# Patient Record
Sex: FEMALE | Race: BLACK OR AFRICAN AMERICAN | NOT HISPANIC OR LATINO
[De-identification: names, ages, dates, MRNs, and addresses within clinical notes are randomized per-mention and may not be internally consistent; named-entity substitution may affect disease eponyms.]

---

## 2017-02-20 ENCOUNTER — TRANSCRIPTION ENCOUNTER (OUTPATIENT)
Age: 62
End: 2017-02-20

## 2017-04-10 ENCOUNTER — OUTPATIENT (OUTPATIENT)
Dept: OUTPATIENT SERVICES | Facility: HOSPITAL | Age: 62
LOS: 1 days | Discharge: HOME | End: 2017-04-10

## 2017-06-27 DIAGNOSIS — D50.9 IRON DEFICIENCY ANEMIA, UNSPECIFIED: ICD-10-CM

## 2017-06-27 DIAGNOSIS — R79.89 OTHER SPECIFIED ABNORMAL FINDINGS OF BLOOD CHEMISTRY: ICD-10-CM

## 2017-06-27 DIAGNOSIS — E55.9 VITAMIN D DEFICIENCY, UNSPECIFIED: ICD-10-CM

## 2017-06-27 DIAGNOSIS — D84.1 DEFECTS IN THE COMPLEMENT SYSTEM: ICD-10-CM

## 2017-06-27 DIAGNOSIS — M06.4 INFLAMMATORY POLYARTHROPATHY: ICD-10-CM

## 2017-08-21 ENCOUNTER — INPATIENT (INPATIENT)
Facility: HOSPITAL | Age: 62
LOS: 1 days | Discharge: HOME | End: 2017-08-23
Attending: INTERNAL MEDICINE

## 2017-08-21 DIAGNOSIS — E03.9 HYPOTHYROIDISM, UNSPECIFIED: ICD-10-CM

## 2017-08-21 DIAGNOSIS — I10 ESSENTIAL (PRIMARY) HYPERTENSION: ICD-10-CM

## 2017-08-21 DIAGNOSIS — R07.89 OTHER CHEST PAIN: ICD-10-CM

## 2017-08-21 DIAGNOSIS — R07.9 CHEST PAIN, UNSPECIFIED: ICD-10-CM

## 2017-08-21 DIAGNOSIS — D64.9 ANEMIA, UNSPECIFIED: ICD-10-CM

## 2017-08-21 DIAGNOSIS — M35.00 SJOGREN SYNDROME, UNSPECIFIED: ICD-10-CM

## 2017-08-25 DIAGNOSIS — M35.00 SJOGREN SYNDROME, UNSPECIFIED: ICD-10-CM

## 2017-08-25 DIAGNOSIS — E03.9 HYPOTHYROIDISM, UNSPECIFIED: ICD-10-CM

## 2017-08-25 DIAGNOSIS — R07.9 CHEST PAIN, UNSPECIFIED: ICD-10-CM

## 2017-08-25 DIAGNOSIS — Z96.641 PRESENCE OF RIGHT ARTIFICIAL HIP JOINT: ICD-10-CM

## 2017-08-25 DIAGNOSIS — K21.9 GASTRO-ESOPHAGEAL REFLUX DISEASE WITHOUT ESOPHAGITIS: ICD-10-CM

## 2017-08-25 DIAGNOSIS — I10 ESSENTIAL (PRIMARY) HYPERTENSION: ICD-10-CM

## 2017-08-25 DIAGNOSIS — E78.5 HYPERLIPIDEMIA, UNSPECIFIED: ICD-10-CM

## 2017-10-18 ENCOUNTER — APPOINTMENT (OUTPATIENT)
Dept: CARDIOLOGY | Facility: CLINIC | Age: 62
End: 2017-10-18

## 2018-03-31 ENCOUNTER — OUTPATIENT (OUTPATIENT)
Dept: OUTPATIENT SERVICES | Facility: HOSPITAL | Age: 63
LOS: 1 days | Discharge: HOME | End: 2018-03-31

## 2018-03-31 DIAGNOSIS — Z12.31 ENCOUNTER FOR SCREENING MAMMOGRAM FOR MALIGNANT NEOPLASM OF BREAST: ICD-10-CM

## 2018-06-09 ENCOUNTER — OUTPATIENT (OUTPATIENT)
Dept: OUTPATIENT SERVICES | Facility: HOSPITAL | Age: 63
LOS: 1 days | Discharge: HOME | End: 2018-06-09

## 2018-06-09 DIAGNOSIS — E03.9 HYPOTHYROIDISM, UNSPECIFIED: ICD-10-CM

## 2018-06-09 DIAGNOSIS — M06.4 INFLAMMATORY POLYARTHROPATHY: ICD-10-CM

## 2018-06-09 DIAGNOSIS — M35.00 SJOGREN SYNDROME, UNSPECIFIED: ICD-10-CM

## 2018-06-09 DIAGNOSIS — M47.892 OTHER SPONDYLOSIS, CERVICAL REGION: ICD-10-CM

## 2018-06-09 DIAGNOSIS — I10 ESSENTIAL (PRIMARY) HYPERTENSION: ICD-10-CM

## 2018-07-02 ENCOUNTER — OUTPATIENT (OUTPATIENT)
Dept: OUTPATIENT SERVICES | Facility: HOSPITAL | Age: 63
LOS: 1 days | Discharge: HOME | End: 2018-07-02

## 2018-07-02 DIAGNOSIS — I25.10 ATHEROSCLEROTIC HEART DISEASE OF NATIVE CORONARY ARTERY WITHOUT ANGINA PECTORIS: ICD-10-CM

## 2018-09-04 ENCOUNTER — OUTPATIENT (OUTPATIENT)
Dept: OUTPATIENT SERVICES | Facility: HOSPITAL | Age: 63
LOS: 1 days | Discharge: HOME | End: 2018-09-04

## 2018-09-04 DIAGNOSIS — R05 COUGH: ICD-10-CM

## 2018-09-28 ENCOUNTER — OUTPATIENT (OUTPATIENT)
Dept: OUTPATIENT SERVICES | Facility: HOSPITAL | Age: 63
LOS: 1 days | Discharge: HOME | End: 2018-09-28

## 2018-09-28 DIAGNOSIS — N64.4 MASTODYNIA: ICD-10-CM

## 2018-09-28 DIAGNOSIS — N63.10 UNSPECIFIED LUMP IN THE RIGHT BREAST, UNSPECIFIED QUADRANT: ICD-10-CM

## 2018-09-28 DIAGNOSIS — N63.20 UNSPECIFIED LUMP IN THE LEFT BREAST, UNSPECIFIED QUADRANT: ICD-10-CM

## 2018-11-06 ENCOUNTER — TRANSCRIPTION ENCOUNTER (OUTPATIENT)
Age: 63
End: 2018-11-06

## 2019-03-28 ENCOUNTER — OUTPATIENT (OUTPATIENT)
Dept: OUTPATIENT SERVICES | Facility: HOSPITAL | Age: 64
LOS: 1 days | Discharge: HOME | End: 2019-03-28

## 2019-03-28 DIAGNOSIS — E78.5 HYPERLIPIDEMIA, UNSPECIFIED: ICD-10-CM

## 2019-03-28 DIAGNOSIS — I10 ESSENTIAL (PRIMARY) HYPERTENSION: ICD-10-CM

## 2019-03-28 DIAGNOSIS — N39.0 URINARY TRACT INFECTION, SITE NOT SPECIFIED: ICD-10-CM

## 2019-03-28 DIAGNOSIS — R79.89 OTHER SPECIFIED ABNORMAL FINDINGS OF BLOOD CHEMISTRY: ICD-10-CM

## 2019-03-28 DIAGNOSIS — M32.10 SYSTEMIC LUPUS ERYTHEMATOSUS, ORGAN OR SYSTEM INVOLVEMENT UNSPECIFIED: ICD-10-CM

## 2019-03-28 DIAGNOSIS — E53.9 VITAMIN B DEFICIENCY, UNSPECIFIED: ICD-10-CM

## 2019-03-28 DIAGNOSIS — D84.1 DEFECTS IN THE COMPLEMENT SYSTEM: ICD-10-CM

## 2019-03-28 DIAGNOSIS — E78.1 PURE HYPERGLYCERIDEMIA: ICD-10-CM

## 2019-04-20 ENCOUNTER — OUTPATIENT (OUTPATIENT)
Dept: OUTPATIENT SERVICES | Facility: HOSPITAL | Age: 64
LOS: 1 days | Discharge: HOME | End: 2019-04-20
Payer: COMMERCIAL

## 2019-04-20 DIAGNOSIS — Z12.31 ENCOUNTER FOR SCREENING MAMMOGRAM FOR MALIGNANT NEOPLASM OF BREAST: ICD-10-CM

## 2019-04-20 PROCEDURE — 77067 SCR MAMMO BI INCL CAD: CPT | Mod: 26

## 2019-04-20 PROCEDURE — 77063 BREAST TOMOSYNTHESIS BI: CPT | Mod: 26

## 2019-04-22 DIAGNOSIS — Z79.52 LONG TERM (CURRENT) USE OF SYSTEMIC STEROIDS: ICD-10-CM

## 2019-04-22 DIAGNOSIS — Z78.0 ASYMPTOMATIC MENOPAUSAL STATE: ICD-10-CM

## 2019-04-22 DIAGNOSIS — M81.0 AGE-RELATED OSTEOPOROSIS WITHOUT CURRENT PATHOLOGICAL FRACTURE: ICD-10-CM

## 2019-04-22 DIAGNOSIS — Z13.820 ENCOUNTER FOR SCREENING FOR OSTEOPOROSIS: ICD-10-CM

## 2019-09-28 ENCOUNTER — TRANSCRIPTION ENCOUNTER (OUTPATIENT)
Age: 64
End: 2019-09-28

## 2019-10-02 ENCOUNTER — APPOINTMENT (OUTPATIENT)
Dept: CARDIOLOGY | Facility: CLINIC | Age: 64
End: 2019-10-02
Payer: COMMERCIAL

## 2019-10-02 PROCEDURE — 93000 ELECTROCARDIOGRAM COMPLETE: CPT

## 2019-10-02 PROCEDURE — 99213 OFFICE O/P EST LOW 20 MIN: CPT

## 2020-01-10 ENCOUNTER — TRANSCRIPTION ENCOUNTER (OUTPATIENT)
Age: 65
End: 2020-01-10

## 2020-04-01 ENCOUNTER — APPOINTMENT (OUTPATIENT)
Dept: CARDIOLOGY | Facility: CLINIC | Age: 65
End: 2020-04-01

## 2020-07-11 ENCOUNTER — OUTPATIENT (OUTPATIENT)
Dept: OUTPATIENT SERVICES | Facility: HOSPITAL | Age: 65
LOS: 1 days | Discharge: HOME | End: 2020-07-11
Payer: COMMERCIAL

## 2020-07-11 DIAGNOSIS — Z12.31 ENCOUNTER FOR SCREENING MAMMOGRAM FOR MALIGNANT NEOPLASM OF BREAST: ICD-10-CM

## 2020-07-11 PROCEDURE — 77063 BREAST TOMOSYNTHESIS BI: CPT | Mod: 26

## 2020-07-11 PROCEDURE — 77067 SCR MAMMO BI INCL CAD: CPT | Mod: 26

## 2021-07-22 ENCOUNTER — NON-APPOINTMENT (OUTPATIENT)
Age: 66
End: 2021-07-22

## 2021-10-19 ENCOUNTER — OUTPATIENT (OUTPATIENT)
Dept: OUTPATIENT SERVICES | Facility: HOSPITAL | Age: 66
LOS: 1 days | Discharge: HOME | End: 2021-10-19
Payer: COMMERCIAL

## 2021-10-19 VITALS
SYSTOLIC BLOOD PRESSURE: 176 MMHG | TEMPERATURE: 98 F | HEIGHT: 59 IN | OXYGEN SATURATION: 100 % | WEIGHT: 136.91 LBS | HEART RATE: 71 BPM | RESPIRATION RATE: 14 BRPM | DIASTOLIC BLOOD PRESSURE: 83 MMHG

## 2021-10-19 DIAGNOSIS — N95.0 POSTMENOPAUSAL BLEEDING: ICD-10-CM

## 2021-10-19 DIAGNOSIS — Z98.891 HISTORY OF UTERINE SCAR FROM PREVIOUS SURGERY: Chronic | ICD-10-CM

## 2021-10-19 DIAGNOSIS — Z01.818 ENCOUNTER FOR OTHER PREPROCEDURAL EXAMINATION: ICD-10-CM

## 2021-10-19 DIAGNOSIS — Z96.641 PRESENCE OF RIGHT ARTIFICIAL HIP JOINT: Chronic | ICD-10-CM

## 2021-10-19 LAB
ALBUMIN SERPL ELPH-MCNC: 4.8 G/DL — SIGNIFICANT CHANGE UP (ref 3.5–5.2)
ALP SERPL-CCNC: 82 U/L — SIGNIFICANT CHANGE UP (ref 30–115)
ALT FLD-CCNC: 19 U/L — SIGNIFICANT CHANGE UP (ref 0–41)
ANION GAP SERPL CALC-SCNC: 13 MMOL/L — SIGNIFICANT CHANGE UP (ref 7–14)
AST SERPL-CCNC: 19 U/L — SIGNIFICANT CHANGE UP (ref 0–41)
BASOPHILS # BLD AUTO: 0.02 K/UL — SIGNIFICANT CHANGE UP (ref 0–0.2)
BASOPHILS NFR BLD AUTO: 0.3 % — SIGNIFICANT CHANGE UP (ref 0–1)
BILIRUB SERPL-MCNC: 0.3 MG/DL — SIGNIFICANT CHANGE UP (ref 0.2–1.2)
BUN SERPL-MCNC: 13 MG/DL — SIGNIFICANT CHANGE UP (ref 10–20)
CALCIUM SERPL-MCNC: 10.4 MG/DL — HIGH (ref 8.5–10.1)
CHLORIDE SERPL-SCNC: 101 MMOL/L — SIGNIFICANT CHANGE UP (ref 98–110)
CO2 SERPL-SCNC: 25 MMOL/L — SIGNIFICANT CHANGE UP (ref 17–32)
CREAT SERPL-MCNC: 0.6 MG/DL — LOW (ref 0.7–1.5)
EOSINOPHIL # BLD AUTO: 0.11 K/UL — SIGNIFICANT CHANGE UP (ref 0–0.7)
EOSINOPHIL NFR BLD AUTO: 1.9 % — SIGNIFICANT CHANGE UP (ref 0–8)
GLUCOSE SERPL-MCNC: 86 MG/DL — SIGNIFICANT CHANGE UP (ref 70–99)
HCT VFR BLD CALC: 39.2 % — SIGNIFICANT CHANGE UP (ref 37–47)
HGB BLD-MCNC: 12.7 G/DL — SIGNIFICANT CHANGE UP (ref 12–16)
IMM GRANULOCYTES NFR BLD AUTO: 0.2 % — SIGNIFICANT CHANGE UP (ref 0.1–0.3)
LYMPHOCYTES # BLD AUTO: 1.81 K/UL — SIGNIFICANT CHANGE UP (ref 1.2–3.4)
LYMPHOCYTES # BLD AUTO: 31.2 % — SIGNIFICANT CHANGE UP (ref 20.5–51.1)
MCHC RBC-ENTMCNC: 29.1 PG — SIGNIFICANT CHANGE UP (ref 27–31)
MCHC RBC-ENTMCNC: 32.4 G/DL — SIGNIFICANT CHANGE UP (ref 32–37)
MCV RBC AUTO: 89.9 FL — SIGNIFICANT CHANGE UP (ref 81–99)
MONOCYTES # BLD AUTO: 0.58 K/UL — SIGNIFICANT CHANGE UP (ref 0.1–0.6)
MONOCYTES NFR BLD AUTO: 10 % — HIGH (ref 1.7–9.3)
NEUTROPHILS # BLD AUTO: 3.27 K/UL — SIGNIFICANT CHANGE UP (ref 1.4–6.5)
NEUTROPHILS NFR BLD AUTO: 56.4 % — SIGNIFICANT CHANGE UP (ref 42.2–75.2)
NRBC # BLD: 0 /100 WBCS — SIGNIFICANT CHANGE UP (ref 0–0)
PLATELET # BLD AUTO: 295 K/UL — SIGNIFICANT CHANGE UP (ref 130–400)
POTASSIUM SERPL-MCNC: 4 MMOL/L — SIGNIFICANT CHANGE UP (ref 3.5–5)
POTASSIUM SERPL-SCNC: 4 MMOL/L — SIGNIFICANT CHANGE UP (ref 3.5–5)
PROT SERPL-MCNC: 8.8 G/DL — HIGH (ref 6–8)
RBC # BLD: 4.36 M/UL — SIGNIFICANT CHANGE UP (ref 4.2–5.4)
RBC # FLD: 12 % — SIGNIFICANT CHANGE UP (ref 11.5–14.5)
SODIUM SERPL-SCNC: 139 MMOL/L — SIGNIFICANT CHANGE UP (ref 135–146)
T3 SERPL-MCNC: 100 NG/DL — SIGNIFICANT CHANGE UP (ref 80–200)
T4 AB SER-ACNC: 9.5 UG/DL — SIGNIFICANT CHANGE UP (ref 4.6–12)
TSH SERPL-MCNC: 0.65 UIU/ML — SIGNIFICANT CHANGE UP (ref 0.27–4.2)
WBC # BLD: 5.8 K/UL — SIGNIFICANT CHANGE UP (ref 4.8–10.8)
WBC # FLD AUTO: 5.8 K/UL — SIGNIFICANT CHANGE UP (ref 4.8–10.8)

## 2021-10-19 PROCEDURE — 71046 X-RAY EXAM CHEST 2 VIEWS: CPT | Mod: 26

## 2021-10-19 PROCEDURE — 93010 ELECTROCARDIOGRAM REPORT: CPT

## 2021-10-19 NOTE — H&P PST ADULT - NSICDXFAMILYHX_GEN_ALL_CORE_FT
FAMILY HISTORY:  Father  Still living? No  FH: kidney failure, Age at diagnosis: Age Unknown    Mother  Still living? Yes, Estimated age: Age Unknown  FH: HTN (hypertension), Age at diagnosis: Age Unknown

## 2021-10-19 NOTE — H&P PST ADULT - ATTENDING COMMENTS
PMB staining x2 years   Obhx-   - Csection male 7.8 Preclampsia, Fever, NRFHT, Nuchal cord   - Csection male 8.2 elective repeat   Gynhx- 10.5/regular/heavy menopause at 56   Denies history of ovarian cysts, fibroids STI or abnormal pap   PMH-  HTN   Sjogrens   Hypothyroid   GERD   IBS   OP  Constipation   PSH- CSx2    hip replacement   Pleurodesis   Tonsillectomy age 7   Rt hand surgery 5th digit   Allergies- PCN   Hay Fever, Seasonal Allergies   Meds-   Norvasc   Losartan   Prednisone   Nexium   Levothyroxine   Claratin   Miralax   Ca   FH- Mother- dx with colon cancer in 60s, s/p resection- A-   Paternal Aunt late 30s ovarian/uterine cancer   Denies family history of breast cancer   SH- Denies alcohol, drugs, smoking   TVUS- uterine fibroids- Left SS 3.4x2.5x2.1   Anterior SS 1.6x1.7x1.1   EMS 0.2cm ( however PMB 2 years)   Ovaries WNL   2021- Pap normal   A/P 63 LMP age 56 with PMB staining   1- Consent, RBA   2- NPO IVF   3- OR

## 2021-10-19 NOTE — H&P PST ADULT - NSICDXPASTMEDICALHX_GEN_ALL_CORE_FT
PAST MEDICAL HISTORY:  GERD (gastroesophageal reflux disease)     H/O left bundle branch block     H/O pneumothorax repaired with pleurodesis    H/O: osteoarthritis     History of IBS     HTN (hypertension)     Hypothyroid     Sjogren's disease

## 2021-10-19 NOTE — H&P PST ADULT - ACTIVITY
Apparently her prandial response is working since she is correcting down into 100-120 range   I am leary if we restart basal, she will have hypoglycemia   Lets watch- she can drop in 1-2 weeks for dexcom download again to review amb

## 2021-10-19 NOTE — H&P PST ADULT - HISTORY OF PRESENT ILLNESS
65y Female presents today for presurgical testing for hysteroscopy dilatation and curettage. Patient reports worsening vaginal dryness over the course of the past year along with occasional post menopausal spotting/bleeding. She states that an attempt to do an in office biopsy was unsuccessful due to patient discomfort.   Patient denies any CP, palpitations, SOB, cough, or dysuria. No recent URI or UTI.  Stated exercise tolerance is FOS 2           MONTY screen reviewed    Patient denies any recent personal exposure to COVID19. Denies any sick contacts. Patient denies any travel within the past 30 days. Patient was instructed to quarantine until after procedure  +Moderna covid19 vaccine completed x2 doses 3/2021 & 4/2021    N95.0, 85006  Postmenopausal bleeding  Encounter for other preprocedural examination  ^N95.0, 90686    Anesthesia Alert  NO--Difficult Airway  NO--History of neck surgery or radiation  NO--Limited ROM of neck  NO--History of Malignant hyperthermia  NO--Personal or family history of Pseudocholinesterase deficiency.  NO--Prior Anesthesia Complication  NO--Latex Allergy  NO--Loose teeth  NO--History of Rheumatoid Arthritis  NO--MONTY  NO--Bleeding risk  NO--Other_____    Patient states that this is their complete medical history and list of medications

## 2021-10-26 ENCOUNTER — OUTPATIENT (OUTPATIENT)
Dept: OUTPATIENT SERVICES | Facility: HOSPITAL | Age: 66
LOS: 1 days | Discharge: HOME | End: 2021-10-26

## 2021-10-26 DIAGNOSIS — Z96.641 PRESENCE OF RIGHT ARTIFICIAL HIP JOINT: Chronic | ICD-10-CM

## 2021-10-26 DIAGNOSIS — Z11.59 ENCOUNTER FOR SCREENING FOR OTHER VIRAL DISEASES: ICD-10-CM

## 2021-10-26 DIAGNOSIS — Z98.891 HISTORY OF UTERINE SCAR FROM PREVIOUS SURGERY: Chronic | ICD-10-CM

## 2021-10-26 PROBLEM — Z86.79 PERSONAL HISTORY OF OTHER DISEASES OF THE CIRCULATORY SYSTEM: Chronic | Status: ACTIVE | Noted: 2021-10-19

## 2021-10-26 PROBLEM — Z87.09 PERSONAL HISTORY OF OTHER DISEASES OF THE RESPIRATORY SYSTEM: Chronic | Status: ACTIVE | Noted: 2021-10-19

## 2021-10-26 PROBLEM — M35.00 SJOGREN SYNDROME, UNSPECIFIED: Chronic | Status: ACTIVE | Noted: 2021-10-19

## 2021-10-26 PROBLEM — Z87.19 PERSONAL HISTORY OF OTHER DISEASES OF THE DIGESTIVE SYSTEM: Chronic | Status: ACTIVE | Noted: 2021-10-19

## 2021-10-26 PROBLEM — K21.9 GASTRO-ESOPHAGEAL REFLUX DISEASE WITHOUT ESOPHAGITIS: Chronic | Status: ACTIVE | Noted: 2021-10-19

## 2021-10-26 PROBLEM — Z87.39 PERSONAL HISTORY OF OTHER DISEASES OF THE MUSCULOSKELETAL SYSTEM AND CONNECTIVE TISSUE: Chronic | Status: ACTIVE | Noted: 2021-10-19

## 2021-10-26 PROBLEM — E03.9 HYPOTHYROIDISM, UNSPECIFIED: Chronic | Status: ACTIVE | Noted: 2021-10-19

## 2021-10-26 PROBLEM — I10 ESSENTIAL (PRIMARY) HYPERTENSION: Chronic | Status: ACTIVE | Noted: 2021-10-19

## 2021-10-29 ENCOUNTER — TRANSCRIPTION ENCOUNTER (OUTPATIENT)
Age: 66
End: 2021-10-29

## 2021-10-29 ENCOUNTER — OUTPATIENT (OUTPATIENT)
Dept: OUTPATIENT SERVICES | Facility: HOSPITAL | Age: 66
LOS: 1 days | Discharge: HOME | End: 2021-10-29
Payer: COMMERCIAL

## 2021-10-29 ENCOUNTER — RESULT REVIEW (OUTPATIENT)
Age: 66
End: 2021-10-29

## 2021-10-29 VITALS
HEART RATE: 79 BPM | RESPIRATION RATE: 16 BRPM | OXYGEN SATURATION: 100 % | SYSTOLIC BLOOD PRESSURE: 144 MMHG | DIASTOLIC BLOOD PRESSURE: 80 MMHG

## 2021-10-29 VITALS
RESPIRATION RATE: 18 BRPM | WEIGHT: 136.91 LBS | OXYGEN SATURATION: 100 % | HEART RATE: 84 BPM | TEMPERATURE: 99 F | HEIGHT: 59 IN | SYSTOLIC BLOOD PRESSURE: 139 MMHG | DIASTOLIC BLOOD PRESSURE: 75 MMHG

## 2021-10-29 DIAGNOSIS — Z98.891 HISTORY OF UTERINE SCAR FROM PREVIOUS SURGERY: Chronic | ICD-10-CM

## 2021-10-29 DIAGNOSIS — N95.0 POSTMENOPAUSAL BLEEDING: ICD-10-CM

## 2021-10-29 DIAGNOSIS — Z96.641 PRESENCE OF RIGHT ARTIFICIAL HIP JOINT: Chronic | ICD-10-CM

## 2021-10-29 PROCEDURE — 88305 TISSUE EXAM BY PATHOLOGIST: CPT | Mod: 26

## 2021-10-29 RX ORDER — ESOMEPRAZOLE MAGNESIUM 40 MG/1
1 CAPSULE, DELAYED RELEASE ORAL
Qty: 0 | Refills: 0 | DISCHARGE

## 2021-10-29 RX ORDER — LOSARTAN POTASSIUM 100 MG/1
1 TABLET, FILM COATED ORAL
Qty: 0 | Refills: 0 | DISCHARGE

## 2021-10-29 RX ORDER — SODIUM CHLORIDE 9 MG/ML
1000 INJECTION, SOLUTION INTRAVENOUS
Refills: 0 | Status: DISCONTINUED | OUTPATIENT
Start: 2021-10-29 | End: 2021-11-12

## 2021-10-29 RX ORDER — ONDANSETRON 8 MG/1
4 TABLET, FILM COATED ORAL ONCE
Refills: 0 | Status: COMPLETED | OUTPATIENT
Start: 2021-10-29 | End: 2021-10-29

## 2021-10-29 RX ORDER — HYDROMORPHONE HYDROCHLORIDE 2 MG/ML
0.5 INJECTION INTRAMUSCULAR; INTRAVENOUS; SUBCUTANEOUS
Refills: 0 | Status: DISCONTINUED | OUTPATIENT
Start: 2021-10-29 | End: 2021-10-29

## 2021-10-29 RX ORDER — AMLODIPINE BESYLATE 2.5 MG/1
1 TABLET ORAL
Qty: 0 | Refills: 0 | DISCHARGE

## 2021-10-29 RX ORDER — LEVOTHYROXINE SODIUM 125 MCG
1 TABLET ORAL
Qty: 0 | Refills: 0 | DISCHARGE

## 2021-10-29 RX ORDER — LORATADINE 10 MG/1
1 TABLET ORAL
Qty: 0 | Refills: 0 | DISCHARGE

## 2021-10-29 RX ADMIN — ONDANSETRON 4 MILLIGRAM(S): 8 TABLET, FILM COATED ORAL at 16:51

## 2021-10-29 RX ADMIN — SODIUM CHLORIDE 75 MILLILITER(S): 9 INJECTION, SOLUTION INTRAVENOUS at 15:46

## 2021-10-29 NOTE — BRIEF OPERATIVE NOTE - OPERATION/FINDINGS
7cm retroverted uterus, atrophic tissue, stenotic cervix 7cm retroverted uterus, atrophic tissue, stenotic cervix ( lacrimal dilators used--> small false passage--> redirected with Pelvic US, US guided curettage) 7cm retroverted uterus, atrophic tissue, stenotic cervix ( lacrimal dilators used--> small false passage--> redirected with Pelvic US, US guided curettage)  right paravaginal cyst ~3cm in distal vagina

## 2021-10-29 NOTE — ASU DISCHARGE PLAN (ADULT/PEDIATRIC) - CARE PROVIDER_API CALL
Cyndee Landon)  Obstetrics and Gynecology  [address] [facility] [place] [address]  Phone: [phone]  Fax: [phone]  Follow Up Time:

## 2021-10-29 NOTE — BRIEF OPERATIVE NOTE - NSICDXBRIEFPROCEDURE_GEN_ALL_CORE_FT
PROCEDURES:  Hysteroscopy with dilation and curettage of uterus with ultrasound guidance 29-Oct-2021 15:40:46  Jeannie Rand

## 2021-10-29 NOTE — ASU PREOP CHECKLIST - WEIGHT IN LBS
Spoke with pt and gave results.  Told pt once we get the PCN in stock we will give him a call to set up a nurse visit.   136.9

## 2021-11-02 LAB — SURGICAL PATHOLOGY STUDY: SIGNIFICANT CHANGE UP

## 2021-11-03 LAB
-  AMIKACIN: SIGNIFICANT CHANGE UP
-  AMPICILLIN/SULBACTAM: SIGNIFICANT CHANGE UP
-  CEFEPIME: SIGNIFICANT CHANGE UP
-  CEFTAZIDIME: SIGNIFICANT CHANGE UP
-  CIPROFLOXACIN: SIGNIFICANT CHANGE UP
-  GENTAMICIN: SIGNIFICANT CHANGE UP
-  LEVOFLOXACIN: SIGNIFICANT CHANGE UP
-  MEROPENEM: SIGNIFICANT CHANGE UP
-  TOBRAMYCIN: SIGNIFICANT CHANGE UP
-  TRIMETHOPRIM/SULFAMETHOXAZOLE: SIGNIFICANT CHANGE UP
CULTURE RESULTS: SIGNIFICANT CHANGE UP
METHOD TYPE: SIGNIFICANT CHANGE UP
SPECIMEN SOURCE: SIGNIFICANT CHANGE UP

## 2021-11-04 DIAGNOSIS — I44.7 LEFT BUNDLE-BRANCH BLOCK, UNSPECIFIED: ICD-10-CM

## 2021-11-04 DIAGNOSIS — M19.90 UNSPECIFIED OSTEOARTHRITIS, UNSPECIFIED SITE: ICD-10-CM

## 2021-11-04 DIAGNOSIS — N87.9 DYSPLASIA OF CERVIX UTERI, UNSPECIFIED: ICD-10-CM

## 2021-11-04 DIAGNOSIS — E03.9 HYPOTHYROIDISM, UNSPECIFIED: ICD-10-CM

## 2021-11-04 DIAGNOSIS — N95.0 POSTMENOPAUSAL BLEEDING: ICD-10-CM

## 2021-11-04 DIAGNOSIS — K21.9 GASTRO-ESOPHAGEAL REFLUX DISEASE WITHOUT ESOPHAGITIS: ICD-10-CM

## 2021-11-04 DIAGNOSIS — Z79.52 LONG TERM (CURRENT) USE OF SYSTEMIC STEROIDS: ICD-10-CM

## 2021-11-04 DIAGNOSIS — I10 ESSENTIAL (PRIMARY) HYPERTENSION: ICD-10-CM

## 2021-11-04 DIAGNOSIS — Z88.0 ALLERGY STATUS TO PENICILLIN: ICD-10-CM

## 2021-11-04 DIAGNOSIS — K58.9 IRRITABLE BOWEL SYNDROME WITHOUT DIARRHEA: ICD-10-CM

## 2021-11-04 DIAGNOSIS — N85.8 OTHER SPECIFIED NONINFLAMMATORY DISORDERS OF UTERUS: ICD-10-CM

## 2021-11-04 DIAGNOSIS — Z96.641 PRESENCE OF RIGHT ARTIFICIAL HIP JOINT: ICD-10-CM

## 2021-11-04 LAB
-  IMIPENEM: SIGNIFICANT CHANGE UP
-  PIPERACILLIN/TAZOBACTAM: SIGNIFICANT CHANGE UP
METHOD TYPE: SIGNIFICANT CHANGE UP
ORGANISM # SPEC MICROSCOPIC CNT: SIGNIFICANT CHANGE UP

## 2021-11-17 ENCOUNTER — APPOINTMENT (OUTPATIENT)
Dept: ORTHOPEDIC SURGERY | Facility: CLINIC | Age: 66
End: 2021-11-17
Payer: COMMERCIAL

## 2021-11-17 DIAGNOSIS — M35.00 SICCA SYNDROME, UNSPECIFIED: ICD-10-CM

## 2021-11-17 DIAGNOSIS — Z78.9 OTHER SPECIFIED HEALTH STATUS: ICD-10-CM

## 2021-11-17 DIAGNOSIS — Z86.39 PERSONAL HISTORY OF OTHER ENDOCRINE, NUTRITIONAL AND METABOLIC DISEASE: ICD-10-CM

## 2021-11-17 DIAGNOSIS — M25.552 PAIN IN LEFT HIP: ICD-10-CM

## 2021-11-17 DIAGNOSIS — Z96.641 PRESENCE OF RIGHT ARTIFICIAL HIP JOINT: ICD-10-CM

## 2021-11-17 DIAGNOSIS — Z86.79 PERSONAL HISTORY OF OTHER DISEASES OF THE CIRCULATORY SYSTEM: ICD-10-CM

## 2021-11-17 PROCEDURE — 73522 X-RAY EXAM HIPS BI 3-4 VIEWS: CPT

## 2021-11-17 PROCEDURE — 99204 OFFICE O/P NEW MOD 45 MIN: CPT

## 2021-11-17 RX ORDER — LORATADINE 5 MG/5 ML
SOLUTION, ORAL ORAL
Refills: 0 | Status: ACTIVE | COMMUNITY

## 2021-11-17 RX ORDER — MELOXICAM 15 MG/1
15 TABLET ORAL DAILY
Qty: 30 | Refills: 0 | Status: COMPLETED | COMMUNITY
Start: 2021-11-17 | End: 2021-12-17

## 2021-11-17 NOTE — HISTORY OF PRESENT ILLNESS
[de-identified] : 65 year old female s/p Right total hip replacement in April 2013 with HSS presents to the office today complaining of left hip pain x 6 years that has worsened in the last year. PAtient reports doing well with her right total hip replacement. In regard to her left hip, she reports pain that is located at the lateral hip, pain in the groin and some anterior thigh pain. Patient reports pain that is sharp in nature. She denies any swelling, buckling, locking, or clicking but does report some loss of motion. Patient reports pain immediately with ambulation but does report a limp. PAtient states negotiating stairs can be difficult, worse with ascending. She states she has difficulty putting socks and shoes on and states that the pain wakes her up at night. Patient reports having an injection into her left hip about 4-5 years ago with relief after the first injection but not after the second. Patient is here today to discuss her next options for pain relief.

## 2021-11-17 NOTE — PHYSICAL EXAM
[de-identified] : General appearance: well nourished and hydrated, pleasant, alert and oriented x 3, cooperative.\par Cardiovascular: no apparent abnormalities, no lower leg edema, no varicosities, pedal pulses are palpable.\par Neurologic: sensation is normal, no muscle weakness in upper or lower extremities\par Dermatologic no apparent skin lesions, moist, warm, no rash.\par Gait: nonantalgic.\par \par Left knee\par Inspection: no effusion or erythema.\par Wounds: none.\par Alignment: normal.\par Palpation: no specific tenderness on palpation.\par ROM: 0-120 degrees\par Ligamentous laxity: all ligaments appear stable\par Meniscal Test: negative McMurrays\par Muscle Test: good quad strength.\par \par Right knee\par Inspection: no effusion or erythema.\par Wounds: none.\par Alignment: normal.\par Palpation: no specific tenderness on palpation.\par ROM: 0-120 degrees \par Ligamentous laxity: all ligaments appear stable\par Meniscal Test: negative McMurrays.\par Muscle Test: good quad strength.\par \par Left hip\par Inspection: No swelling or ecchymosis.\par Wounds: none.\par Palpation: non-tender.\par Stability: no instability.\par Strength: 5/5 all motor groups.\par ROM: Flexion to 70, ER 30, ABD 30\par Leg length: equal.\par \par Right hip\par Inspection: No swelling or ecchymosis.\par Wounds: none.\par Palpation: non-tender.\par Stability: no instability.\par Strength: 5/5 all motor groups.\par ROM: Flexion to 70, ER 30, ABD 30 \par Leg length: equal.\par \par   [de-identified] : Radiographs done today AP pelvis and lateral of hips shows well aligned cementless total hip on the right ,well fixed. The left shows mild acetabular dysplasia with moderate joint space narrowing.

## 2021-11-17 NOTE — ASSESSMENT
[FreeTextEntry1] : 65 year old female presents to the office with complaints of left hip pain. The pain is located in the groin. And over the past year it has worsened. She describes the pain as 8-10, she has difficulty with ambulation and negotiating stairs, as well as putting on shoes and socks. Her radiographs show mild to moderate arthritis but still has a reasonable joint space. Her exam reveals a good ROM. Her pain seems to be out of proportion to her radiographs. Which makes me think there may also be some labral pathology. We will put her on a 2-3 week course of Mobic and have her return to the office to evaluated.

## 2021-12-06 ENCOUNTER — APPOINTMENT (OUTPATIENT)
Dept: ORTHOPEDIC SURGERY | Facility: CLINIC | Age: 66
End: 2021-12-06

## 2021-12-10 ENCOUNTER — EMERGENCY (EMERGENCY)
Facility: HOSPITAL | Age: 66
LOS: 0 days | Discharge: HOME | End: 2021-12-10
Attending: EMERGENCY MEDICINE | Admitting: EMERGENCY MEDICINE
Payer: COMMERCIAL

## 2021-12-10 VITALS
SYSTOLIC BLOOD PRESSURE: 129 MMHG | OXYGEN SATURATION: 99 % | TEMPERATURE: 98 F | DIASTOLIC BLOOD PRESSURE: 80 MMHG | RESPIRATION RATE: 18 BRPM | HEART RATE: 82 BPM

## 2021-12-10 VITALS — HEIGHT: 59 IN | WEIGHT: 149.91 LBS

## 2021-12-10 DIAGNOSIS — Z23 ENCOUNTER FOR IMMUNIZATION: ICD-10-CM
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
## 2021-12-10 DIAGNOSIS — S80.811A ABRASION, RIGHT LOWER LEG, INITIAL ENCOUNTER: ICD-10-CM

## 2021-12-10 DIAGNOSIS — Z98.891 HISTORY OF UTERINE SCAR FROM PREVIOUS SURGERY: Chronic | ICD-10-CM

## 2021-12-10 DIAGNOSIS — Z88.6 ALLERGY STATUS TO ANALGESIC AGENT: ICD-10-CM

## 2021-12-10 DIAGNOSIS — E78.5 HYPERLIPIDEMIA, UNSPECIFIED: ICD-10-CM

## 2021-12-10 DIAGNOSIS — I10 ESSENTIAL (PRIMARY) HYPERTENSION: ICD-10-CM
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
## 2021-12-10 DIAGNOSIS — Z96.641 PRESENCE OF RIGHT ARTIFICIAL HIP JOINT: Chronic | ICD-10-CM

## 2021-12-10 DIAGNOSIS — Z88.0 ALLERGY STATUS TO PENICILLIN: ICD-10-CM

## 2021-12-10 DIAGNOSIS — E03.9 HYPOTHYROIDISM, UNSPECIFIED: ICD-10-CM

## 2021-12-10 PROCEDURE — 99283 EMERGENCY DEPT VISIT LOW MDM: CPT

## 2021-12-10 RX ORDER — TETANUS TOXOID, REDUCED DIPHTHERIA TOXOID AND ACELLULAR PERTUSSIS VACCINE, ADSORBED 5; 2.5; 8; 8; 2.5 [IU]/.5ML; [IU]/.5ML; UG/.5ML; UG/.5ML; UG/.5ML
0.5 SUSPENSION INTRAMUSCULAR ONCE
Refills: 0 | Status: COMPLETED | OUTPATIENT
Start: 2021-12-10 | End: 2021-12-10

## 2021-12-10 RX ADMIN — TETANUS TOXOID, REDUCED DIPHTHERIA TOXOID AND ACELLULAR PERTUSSIS VACCINE, ADSORBED 0.5 MILLILITER(S): 5; 2.5; 8; 8; 2.5 SUSPENSION INTRAMUSCULAR at 08:20

## 2021-12-10 NOTE — ED PROVIDER NOTE - OBJECTIVE STATEMENT
65 y.o female w/ hx of HLD presents to the ED for evaluation of abrasion. c at scratch to posterior aspect RLE 5 days ago.  no redness, drainage, discharge. healing well w/ no signs of infection. states she was not bit by the cat.

## 2021-12-10 NOTE — ED PROVIDER NOTE - NS ED ROS FT
Constitutional: See HPI.  Eyes: No visual changes, eye pain or discharge.  ENMT: No hearing changes, pain, discharge or infections  Cardiac: No SOB or edema. No chest pain with exertion.  Respiratory: No cough or respiratory distress  GI: No nausea, vomiting, diarrhea or abdominal pain.  : No dysuria, frequency or burning. No Discharge  MS: No myalgia, muscle weakness, joint pain or back pain.  Neuro: No headache or weakness  Skin: + abrasion.   Except as documented in the HPI, all other systems are negative.

## 2021-12-10 NOTE — ED PROVIDER NOTE - PHYSICAL EXAMINATION
CONST: Well appearing in NAD  EYES: Sclera and conjunctiva clear.  MS: Normal ROM in all extremities. No edema of lower extremities, no calf pain, radial pulses 2+ bilaterally  SKIN: + abrasion R calf, no signs of infection.   NEURO: A&Ox3, No focal deficits. Strength 5/5 with no sensory deficits. Steady gait

## 2021-12-10 NOTE — ED PROVIDER NOTE - PROGRESS NOTE DETAILS
Discussed results with pt.  All questions were answered and return precautions discussed.  Pt is asx and comfortable at this time.  Unremarkable re-exam.  No further concerns at this time from pt.  Will follow up with PMD.  Pt understands and agrees with tx plan. ATTENDING NOTE: 66 y/o F reports being scratched by a cat. She was referred to ED for consideration of the rabies vaccine. (+) Superficial abrasion noted. Pt is 100% sure that there was no bite. Rabies not indicated. ATTENDING NOTE: 66 y/o F reports being scratched by a cat. She was referred to ED for consideration of the rabies vaccine. (+) Superficial abrasion noted. Pt is 100% sure that there was no bite. Rabies vaccine not indicated.

## 2021-12-10 NOTE — ED PROVIDER NOTE - PATIENT PORTAL LINK FT
You can access the FollowMyHealth Patient Portal offered by [facility] by registering at the following website: [email or web address]. By joining [employer]’s FollowMyHealth portal, you will also be able to view your health information using other applications (apps) compatible with our system.

## 2021-12-14 ENCOUNTER — RX RENEWAL (OUTPATIENT)
Age: 66
End: 2021-12-14

## 2021-12-22 NOTE — ED PROVIDER NOTE - MDM ORDERS SUBMITTED SELECTION
PRINCIPAL DISCHARGE DIAGNOSIS  Diagnosis: Anemia  Assessment and Plan of Treatment: Continue to take current medications as prescribed.  Follow up your routine physician's appointments.  If you notice any bleeding, please notify your doctor immediately or go to the nearest emergency room. You have history of a GI bleed. You were given packed red blood cells during this admission and your hemoglobin is now stable for discharge. On discharge, plesae follow-up with Dr. Guevara for potential start of durvalumab consolidation.        SECONDARY DISCHARGE DIAGNOSES  Diagnosis: Pancytopenia  Assessment and Plan of Treatment: Hematology oncology was following you during this hospitalization. You were given zarxio which improved your neutrophil count. If you have any fever and/or chills, please contact your primary care doctor for further follow up.       Diagnosis: Hypertension  Assessment and Plan of Treatment: Low sodium and fat diet, continue anti-hypertensive medications, and follow up with primary care physician.    Diagnosis: Hyperlipidemia  Assessment and Plan of Treatment: Low fat diet, exercise daily and continue current medications. Follow up with primary care physician and cardiologist for management.     PRINCIPAL DISCHARGE DIAGNOSIS  Diagnosis: Anemia  Assessment and Plan of Treatment: Continue to take current medications as prescribed.  Follow up your routine physician's appointments.  If you notice any bleeding, please notify your doctor immediately or go to the nearest emergency room. You have history of a GI bleed. You were given packed red blood cells during this admission and your hemoglobin is now stable for discharge. On discharge, plesae follow-up with Dr. Guevara for potential start of durvalumab consolidation.        SECONDARY DISCHARGE DIAGNOSES  Diagnosis: Pancytopenia  Assessment and Plan of Treatment: Hematology oncology was following you during this hospitalization. You recieved packed red blood cells and platelets. You were given zarxio which improved your neutrophil count. If you have any fever and/or chills, please contact your primary care doctor for further follow up.       Diagnosis: Hyperlipidemia  Assessment and Plan of Treatment: Low fat diet, exercise daily and continue current medications. Follow up with primary care physician and cardiologist for management.    Diagnosis: Hypertension  Assessment and Plan of Treatment: Low sodium and fat diet, continue anti-hypertensive medications, and follow up with primary care physician.     Medications

## 2022-06-09 ENCOUNTER — APPOINTMENT (OUTPATIENT)
Dept: CARDIOLOGY | Facility: CLINIC | Age: 67
End: 2022-06-09

## 2022-06-09 PROCEDURE — 99214 OFFICE O/P EST MOD 30 MIN: CPT

## 2022-06-09 PROCEDURE — 93000 ELECTROCARDIOGRAM COMPLETE: CPT

## 2022-06-16 ENCOUNTER — INPATIENT (INPATIENT)
Facility: HOSPITAL | Age: 67
LOS: 1 days | Discharge: HOME | End: 2022-06-18
Attending: HOSPITALIST | Admitting: HOSPITALIST
Payer: COMMERCIAL

## 2022-06-16 ENCOUNTER — APPOINTMENT (OUTPATIENT)
Dept: CARDIOLOGY | Facility: CLINIC | Age: 67
End: 2022-06-16

## 2022-06-16 VITALS
DIASTOLIC BLOOD PRESSURE: 88 MMHG | RESPIRATION RATE: 18 BRPM | TEMPERATURE: 97 F | OXYGEN SATURATION: 97 % | SYSTOLIC BLOOD PRESSURE: 164 MMHG | HEART RATE: 102 BPM | HEIGHT: 59 IN

## 2022-06-16 DIAGNOSIS — Z96.641 PRESENCE OF RIGHT ARTIFICIAL HIP JOINT: Chronic | ICD-10-CM

## 2022-06-16 DIAGNOSIS — Z87.09 PERSONAL HISTORY OF OTHER DISEASES OF THE RESPIRATORY SYSTEM: Chronic | ICD-10-CM

## 2022-06-16 DIAGNOSIS — Z98.891 HISTORY OF UTERINE SCAR FROM PREVIOUS SURGERY: Chronic | ICD-10-CM

## 2022-06-16 LAB
ALBUMIN SERPL ELPH-MCNC: 5 G/DL — SIGNIFICANT CHANGE UP (ref 3.5–5.2)
ALP SERPL-CCNC: 61 U/L — SIGNIFICANT CHANGE UP (ref 30–115)
ALT FLD-CCNC: 14 U/L — SIGNIFICANT CHANGE UP (ref 0–41)
ANION GAP SERPL CALC-SCNC: 13 MMOL/L — SIGNIFICANT CHANGE UP (ref 7–14)
AST SERPL-CCNC: 19 U/L — SIGNIFICANT CHANGE UP (ref 0–41)
BASOPHILS # BLD AUTO: 0.01 K/UL — SIGNIFICANT CHANGE UP (ref 0–0.2)
BASOPHILS NFR BLD AUTO: 0.3 % — SIGNIFICANT CHANGE UP (ref 0–1)
BILIRUB SERPL-MCNC: 0.5 MG/DL — SIGNIFICANT CHANGE UP (ref 0.2–1.2)
BUN SERPL-MCNC: 12 MG/DL — SIGNIFICANT CHANGE UP (ref 10–20)
CALCIUM SERPL-MCNC: 9.7 MG/DL — SIGNIFICANT CHANGE UP (ref 8.5–10.1)
CHLORIDE SERPL-SCNC: 104 MMOL/L — SIGNIFICANT CHANGE UP (ref 98–110)
CK MB CFR SERPL CALC: 1.5 NG/ML — SIGNIFICANT CHANGE UP (ref 0.6–6.3)
CK MB CFR SERPL CALC: 1.8 NG/ML — SIGNIFICANT CHANGE UP (ref 0.6–6.3)
CK SERPL-CCNC: 74 U/L — SIGNIFICANT CHANGE UP (ref 0–225)
CK SERPL-CCNC: 78 U/L — SIGNIFICANT CHANGE UP (ref 0–225)
CO2 SERPL-SCNC: 23 MMOL/L — SIGNIFICANT CHANGE UP (ref 17–32)
CREAT SERPL-MCNC: 0.7 MG/DL — SIGNIFICANT CHANGE UP (ref 0.7–1.5)
EGFR: 97 ML/MIN/1.73M2 — SIGNIFICANT CHANGE UP
EOSINOPHIL # BLD AUTO: 0.09 K/UL — SIGNIFICANT CHANGE UP (ref 0–0.7)
EOSINOPHIL NFR BLD AUTO: 2.3 % — SIGNIFICANT CHANGE UP (ref 0–8)
GLUCOSE SERPL-MCNC: 90 MG/DL — SIGNIFICANT CHANGE UP (ref 70–99)
HCT VFR BLD CALC: 38.2 % — SIGNIFICANT CHANGE UP (ref 37–47)
HGB BLD-MCNC: 12.3 G/DL — SIGNIFICANT CHANGE UP (ref 12–16)
IMM GRANULOCYTES NFR BLD AUTO: 0.3 % — SIGNIFICANT CHANGE UP (ref 0.1–0.3)
LYMPHOCYTES # BLD AUTO: 1.35 K/UL — SIGNIFICANT CHANGE UP (ref 1.2–3.4)
LYMPHOCYTES # BLD AUTO: 34.2 % — SIGNIFICANT CHANGE UP (ref 20.5–51.1)
MCHC RBC-ENTMCNC: 29.1 PG — SIGNIFICANT CHANGE UP (ref 27–31)
MCHC RBC-ENTMCNC: 32.2 G/DL — SIGNIFICANT CHANGE UP (ref 32–37)
MCV RBC AUTO: 90.5 FL — SIGNIFICANT CHANGE UP (ref 81–99)
MONOCYTES # BLD AUTO: 0.37 K/UL — SIGNIFICANT CHANGE UP (ref 0.1–0.6)
MONOCYTES NFR BLD AUTO: 9.4 % — HIGH (ref 1.7–9.3)
NEUTROPHILS # BLD AUTO: 2.12 K/UL — SIGNIFICANT CHANGE UP (ref 1.4–6.5)
NEUTROPHILS NFR BLD AUTO: 53.5 % — SIGNIFICANT CHANGE UP (ref 42.2–75.2)
NRBC # BLD: 0 /100 WBCS — SIGNIFICANT CHANGE UP (ref 0–0)
PLATELET # BLD AUTO: 219 K/UL — SIGNIFICANT CHANGE UP (ref 130–400)
POTASSIUM SERPL-MCNC: 3.8 MMOL/L — SIGNIFICANT CHANGE UP (ref 3.5–5)
POTASSIUM SERPL-SCNC: 3.8 MMOL/L — SIGNIFICANT CHANGE UP (ref 3.5–5)
PROT SERPL-MCNC: 8.3 G/DL — HIGH (ref 6–8)
RBC # BLD: 4.22 M/UL — SIGNIFICANT CHANGE UP (ref 4.2–5.4)
RBC # FLD: 12.3 % — SIGNIFICANT CHANGE UP (ref 11.5–14.5)
SARS-COV-2 RNA SPEC QL NAA+PROBE: SIGNIFICANT CHANGE UP
SODIUM SERPL-SCNC: 140 MMOL/L — SIGNIFICANT CHANGE UP (ref 135–146)
TROPONIN T SERPL-MCNC: <0.01 NG/ML — SIGNIFICANT CHANGE UP
WBC # BLD: 3.95 K/UL — LOW (ref 4.8–10.8)
WBC # FLD AUTO: 3.95 K/UL — LOW (ref 4.8–10.8)

## 2022-06-16 PROCEDURE — 71045 X-RAY EXAM CHEST 1 VIEW: CPT | Mod: 26

## 2022-06-16 PROCEDURE — 93010 ELECTROCARDIOGRAM REPORT: CPT

## 2022-06-16 PROCEDURE — 99221 1ST HOSP IP/OBS SF/LOW 40: CPT

## 2022-06-16 PROCEDURE — 93306 TTE W/DOPPLER COMPLETE: CPT | Mod: 26

## 2022-06-16 PROCEDURE — 99285 EMERGENCY DEPT VISIT HI MDM: CPT

## 2022-06-16 RX ORDER — AMLODIPINE BESYLATE 2.5 MG/1
10 TABLET ORAL DAILY
Refills: 0 | Status: DISCONTINUED | OUTPATIENT
Start: 2022-06-16 | End: 2022-06-18

## 2022-06-16 RX ORDER — LORATADINE 10 MG/1
10 TABLET ORAL DAILY
Refills: 0 | Status: DISCONTINUED | OUTPATIENT
Start: 2022-06-16 | End: 2022-06-18

## 2022-06-16 RX ORDER — ACETAMINOPHEN 500 MG
650 TABLET ORAL EVERY 6 HOURS
Refills: 0 | Status: DISCONTINUED | OUTPATIENT
Start: 2022-06-16 | End: 2022-06-18

## 2022-06-16 RX ORDER — LORATADINE 10 MG/1
0 TABLET ORAL
Qty: 0 | Refills: 0 | DISCHARGE

## 2022-06-16 RX ORDER — FAMOTIDINE 10 MG/ML
1 INJECTION INTRAVENOUS
Qty: 0 | Refills: 0 | DISCHARGE

## 2022-06-16 RX ORDER — LANOLIN ALCOHOL/MO/W.PET/CERES
3 CREAM (GRAM) TOPICAL AT BEDTIME
Refills: 0 | Status: DISCONTINUED | OUTPATIENT
Start: 2022-06-16 | End: 2022-06-18

## 2022-06-16 RX ORDER — FAMOTIDINE 10 MG/ML
20 INJECTION INTRAVENOUS
Refills: 0 | Status: DISCONTINUED | OUTPATIENT
Start: 2022-06-16 | End: 2022-06-18

## 2022-06-16 RX ORDER — LOSARTAN POTASSIUM 100 MG/1
100 TABLET, FILM COATED ORAL DAILY
Refills: 0 | Status: DISCONTINUED | OUTPATIENT
Start: 2022-06-16 | End: 2022-06-18

## 2022-06-16 RX ORDER — LEVOTHYROXINE SODIUM 125 MCG
0 TABLET ORAL
Qty: 0 | Refills: 0 | DISCHARGE

## 2022-06-16 RX ORDER — FAMOTIDINE 10 MG/ML
0 INJECTION INTRAVENOUS
Qty: 0 | Refills: 0 | DISCHARGE

## 2022-06-16 RX ORDER — AMLODIPINE BESYLATE 2.5 MG/1
1 TABLET ORAL
Qty: 0 | Refills: 0 | DISCHARGE

## 2022-06-16 RX ORDER — ONDANSETRON 8 MG/1
4 TABLET, FILM COATED ORAL EVERY 8 HOURS
Refills: 0 | Status: DISCONTINUED | OUTPATIENT
Start: 2022-06-16 | End: 2022-06-18

## 2022-06-16 RX ORDER — LOSARTAN POTASSIUM 100 MG/1
0 TABLET, FILM COATED ORAL
Qty: 0 | Refills: 0 | DISCHARGE

## 2022-06-16 RX ORDER — LEVOTHYROXINE SODIUM 125 MCG
50 TABLET ORAL DAILY
Refills: 0 | Status: DISCONTINUED | OUTPATIENT
Start: 2022-06-16 | End: 2022-06-18

## 2022-06-16 RX ORDER — LOSARTAN POTASSIUM 100 MG/1
1 TABLET, FILM COATED ORAL
Qty: 0 | Refills: 0 | DISCHARGE

## 2022-06-16 RX ORDER — LORATADINE 10 MG/1
1 TABLET ORAL
Qty: 0 | Refills: 0 | DISCHARGE

## 2022-06-16 RX ORDER — LEVOTHYROXINE SODIUM 125 MCG
1 TABLET ORAL
Qty: 0 | Refills: 0 | DISCHARGE

## 2022-06-16 RX ORDER — ENOXAPARIN SODIUM 100 MG/ML
40 INJECTION SUBCUTANEOUS EVERY 24 HOURS
Refills: 0 | Status: DISCONTINUED | OUTPATIENT
Start: 2022-06-16 | End: 2022-06-18

## 2022-06-16 RX ORDER — ESOMEPRAZOLE MAGNESIUM 40 MG/1
0 CAPSULE, DELAYED RELEASE ORAL
Qty: 0 | Refills: 0 | DISCHARGE

## 2022-06-16 RX ORDER — AMLODIPINE BESYLATE 2.5 MG/1
0 TABLET ORAL
Qty: 0 | Refills: 0 | DISCHARGE

## 2022-06-16 RX ADMIN — FAMOTIDINE 20 MILLIGRAM(S): 10 INJECTION INTRAVENOUS at 18:33

## 2022-06-16 RX ADMIN — ENOXAPARIN SODIUM 40 MILLIGRAM(S): 100 INJECTION SUBCUTANEOUS at 21:19

## 2022-06-16 NOTE — H&P ADULT - NSHPREVIEWOFSYSTEMS_GEN_ALL_CORE
General:	no fever, weight loss,  chills  Skin: no rash, ulcers  Ophthalmologic: no visual changes  ENMT:	no sore throat  Respiratory and Thorax: no cough, wheeze,  sob  Cardiovascular:	+ chest pain, + palpitations, no dizziness  Gastrointestinal:	no nausea, vomiting, diarrhea, abd pain  Genitourinary:	no dysuria, hematuria  Musculoskeletal:	no joint pains  Neurological:	 no speech disturbance, focal weakness, numbness  Psychiatric:	no depression, anxiety, psychosis  Hematology/Lymphatics:	no anemia  Endocrine:	no polyuria, polydipsia

## 2022-06-16 NOTE — ED PROVIDER NOTE - PROGRESS NOTE DETAILS
Bill: Myocardial perfusion imaging report received via fax.  Study performed on September 10, 2021.  Perfusion showed rest images with fixed apical defect.  Stress images showed fixed apical defect and small to moderate intensity reversible inferior apical defect.  LV function was estimated to be 55%.  Impression was findings are consistent with apical infarction and small to moderate area moderate intensity apical inferior reversible defect.  In view of the abnormal nuclear stress test.  And the left bundle branch block on EKG with limits its ability to detect ischemia, will admit for cardiac monitoring.

## 2022-06-16 NOTE — H&P ADULT - NSHPPHYSICALEXAM_GEN_ALL_CORE
Vital Signs Last 24 Hrs  T(F): 97.1 (16 Jun 2022 10:19), Max: 97.1 (16 Jun 2022 10:19)  HR: 71 (16 Jun 2022 12:50) (71 - 102)  BP: 170/79 (16 Jun 2022 12:50) (164/88 - 170/79)  RR: 18 (16 Jun 2022 12:50) (18 - 18)  SpO2: 98% (16 Jun 2022 12:50) (97% - 98%)    PHYSICAL EXAM:      Constitutional: A&Ox4  Respiratory: cta b/l  Cardiovascular: s1 s2 rrr  Gastrointestinal: soft nt  nd + bs no rebound or guarding  Genitourinary: no cva tenderness  Extremities: normal rom, no edema, calf tenderness  Neurological:no focal deficits  Skin: no rash

## 2022-06-16 NOTE — ED PROVIDER NOTE - PHYSICAL EXAMINATION
Vital Signs: I have reviewed the initial vital signs.  Constitutional: well-nourished, no acute distress, normocephalic  Eyes: PERRLA, EOMI,  clear conjunctiva  ENT: MMM  Cardiovascular: regular rate, regular rhythm, no murmur appreciated  Respiratory: unlabored respiratory effort, clear to auscultation bilaterally, no chest wall tenderness  Gastrointestinal: soft, non-tender, non-distended  abdomen, no pulsatile mass  Musculoskeletal: supple neck, no lower extremity edema or calf tenderness, no bony tenderness  Integumentary: warm, dry, no rash  Neurologic: awake, alert, cranial nerves II-XII grossly intact, extremities’ motor and sensory functions grossly intact, no focal deficits, GCS 15

## 2022-06-16 NOTE — ED PROVIDER NOTE - CLINICAL SUMMARY MEDICAL DECISION MAKING FREE TEXT BOX
Patient presents with chest pain atypical for acute coronary syndrome.  However she had an abnormal stress test last year.  In addition, the presence of the left bundle branch block on her EKG interferes with assessment of any dynamic EKG changes.  My opinion, inpatient care is medically necessary.

## 2022-06-16 NOTE — H&P ADULT - HISTORY OF PRESENT ILLNESS
Patient is a 67yo female with a PMH of a LBBB, hypothyroidism, hypertension, and gastric reflux presents to the ED with intermittent left sided chest pain for a week. The patient states this pain is a "burning and squeezing" quality that radiates down her left arm and shoulder and she states the pain is worse when she is "not occupied" and the pain usually lasts a few minutes. Patient admits to occasional palpitations denies shortness of breath, dizziness, cough, abdominal pain, hemoptysis, intermittent claudication, and edema. Patient is a 67yo female with a PMH of a LBBB, hypothyroidism, hypertension, and GERD presents to the ED with intermittent left sided chest pain for a week. The patient states this pain is a "burning and squeezing" quality that radiates down her left arm and shoulder and she states the pain is worse when she is "not occupied" and the pain usually lasts a few minutes. Patient admits to occasional palpitations denies shortness of breath, dizziness, cough, abdominal pain, hemoptysis, intermittent claudication, and edema. The patient endorses getting a nuclear stress test in the summer 2021, reportedly abnormal.

## 2022-06-16 NOTE — ED PROVIDER NOTE - OBJECTIVE STATEMENT
67 y/o female with hx of LBBB, HTN presents to the ED with left sided intermittent cp over past few days. patient denies any change in position or movement. patient denies any sob, hemoptysis, dizziness or back pain. no calf tenderness. no recent heavy lifting or falls. no rashes. patient had appt with her cardiologist this am

## 2022-06-16 NOTE — CONSULT NOTE ADULT - NS ATTEND AMEND GEN_ALL_CORE FT
Patient with pain under l breast . Pain intermittent at rest. Not exertional. She can walk blocks no pain. She works 2 jobs . No pain with exertion. Agree do adenocard thallium. If worse may need cardiac cath. Begin beta statin if needed asa

## 2022-06-16 NOTE — ED PROVIDER NOTE - OTHER RECORDS SUMMARY FREE TEXT FOR MDM OBTAINED AND REVIEWED OLD RECORDS QUESTION
Nuclear medicine cardiac stress test performed last year.  Patient had a normal CAT scan of the heart in 2018.

## 2022-06-16 NOTE — H&P ADULT - ASSESSMENT
Patient is a 65yo female with a PMH of a LBBB, hypothyroidism, hypertension, and GERD presents to the ED with intermittent left sided chest pain for a week, being admitted to r/o ACS.    #chest pain, hx of LBBB  -admit to telemetry  -serial cardiac enzymes  -cardiology consult  -has nsaid allergy  -repeat ekg in am  -echo  -am lipid profile    #hypothyroid  -c/w synthroid    #HTN  -low sodium diet  -monitor bp  -cont meds from home losartan and amlodipine    #GERD  famotidine    #Sjogren's   -c/w prednisone

## 2022-06-16 NOTE — CONSULT NOTE ADULT - SUBJECTIVE AND OBJECTIVE BOX
HPI:  Patient is a 67yo female with a PMH of a LBBB, hypothyroidism, hypertension, and gastric reflux presents to the ED with intermittent left sided chest pain for a week. The patient states this pain is a "burning and squeezing" quality that radiates down her left arm and shoulder and she states the pain is worse when she is "not occupied" and the pain usually lasts a few minutes. Patient admits to occasional palpitations denies shortness of breath, dizziness, cough, abdominal pain, hemoptysis, intermittent claudication, and edema. (2022 13:45)      HPI-Cardiology   Pt evaluated at bedside. Pt endorses having intermittent left substernal pain for about 1 week. Pian has burning-squeezing quality at rest, radiating to left arm, lasts for a minute or less and disappears without any interventions. She is usually able to walk 2blocks without anginal symptoms Pt had this type of pain in the past, went pcp and had pharm nuclear stress test 21. Findings were consistent with apical infarction and small/moderate area moderate intensity inferoapical reversible defect (copy in the chart). She was told she only needs medical management and Rx medications which she never picked up. Aware about LBBB on ecg and states been having it for few years. She denies sob, dizziness, LE edema. Admitted to telemetry. Radiology tests and hospital records, were reviewed, as well as previous notes on this patient.   Pain free now, ambulatory without pain.    PAST MEDICAL & SURGICAL HISTORY  HTN (hypertension)  Hypothyroid  H/O left bundle branch block  Sjogren's disease  H/O pneumothorax repaired with pleurodesis  History of IBS  H/O: osteoarthritis  GERD (gastroesophageal reflux disease)  History of right hip replacement  H/O  section  H/O pneumothorax      FAMILY HISTORY:  HTN (hypertension) (Mother)  kidney failure (Father)    SOCIAL HISTORY:  []smoker - denies  []Alcohol - denies  []Drug - denies    ALLERGIES:  ibuprofen (Other)  penicillin (Unknown)      MEDICATIONS:  MEDICATIONS  (STANDING):  amLODIPine   Tablet 10 milliGRAM(s) Oral daily  enoxaparin Injectable 40 milliGRAM(s) SubCutaneous every 24 hours  famotidine    Tablet 20 milliGRAM(s) Oral two times a day  levothyroxine 50 MICROGram(s) Oral daily  loratadine 10 milliGRAM(s) Oral daily  losartan 100 milliGRAM(s) Oral daily  predniSONE   Tablet 1 milliGRAM(s) Oral daily  MEDICATIONS  (PRN):  acetaminophen     Tablet .. 650 milliGRAM(s) Oral every 6 hours PRN Temp greater or equal to 38C (100.4F), Mild Pain (1 - 3)  aluminum hydroxide/magnesium hydroxide/simethicone Suspension 30 milliLiter(s) Oral every 4 hours PRN Dyspepsia  melatonin 3 milliGRAM(s) Oral at bedtime PRN Insomnia  ondansetron Injectable 4 milliGRAM(s) IV Push every 8 hours PRN Nausea and/or Vomiting      HOME MEDICATIONS:  amLODIPine 10 mg oral tablet: 1 tab(s) orally once a day (2022 13:45)  Claritin 10 mg oral tablet: 1 tab(s) orally once a day (2022 13:45)  famotidine 20 mg oral tablet: 1 tab(s) orally 2 times a day (2022 13:45)  losartan 100 mg oral tablet: 1 tab(s) orally once a day (2022 13:45)  predniSONE 1 mg oral tablet: 1 tab(s) orally once a day (2022 13:45)  Synthroid 50 mcg (0.05 mg) oral tablet: 1 tab(s) orally once a day (2022 13:45)      VITALS:   T(F): 97.1 ( @ 10:19), Max: 97.1 (16 @ 10:19)  HR: 71 ( @ 12:50) (71 - 102)  BP: 170/79 ( @ 12:50) (164/88 - 170/79)  BP(mean): --  RR: 18 (16 @ 12:50) (18 - 18)  SpO2: 98% ( @ 12:50) (97% - 98%)  I&O's Summary      REVIEW OF SYSTEMS:  CONSTITUTIONAL: No weakness, fevers or chills  EYES: No visual changes  ENT: No vertigo or throat pain   NECK: No pain or stiffness  RESPIRATORY: No cough, wheezing, hemoptysis; No shortness of breath  CARDIOVASCULAR: see hpi  GASTROINTESTINAL: No abdominal or epigastric pain. No nausea, vomiting, or hematemesis; No diarrhea or constipation. No melena or hematochezia.  GENITOURINARY: No dysuria, frequency or hematuria  NEUROLOGICAL: No numbness or weakness  SKIN: No itching, no rashes  MSK: no joint swelling or pain    PHYSICAL EXAM:  GEN: Not in acute distress, resting comfortably  NECK: no thyroid enlargement, no cervical adenopathy  LUNGS: Clear to auscultation bilaterally, no retractions, no nasal flaring  CARDIOVASCULAR: S1/S2 present, RRR , no murmurs or rubs, no carotid bruits, no JVD,  + PP bilaterally  GI: Soft, non-tender, non-distended, +BS  NEURO: patient is awake , alert and oriented, no weakness, no facial drooping  Vascular: No LE edema, pedal pulses 2+  SKIN: Intact    LABS:                        12.3   3.95  )-----------( 219      ( 2022 10:20 )             38.2     140  |  104  |  12  ----------------------------<  90  3.8   |  23  |  0.7  Ca    9.7      2022 10:20  TPro  8.3<H>  /  Alb  5.0  /  TBili  0.5  /  DBili  x   /  AST  19  /  ALT  14  /  AlkPhos  61      Troponin T, Serum: <0.01 ng/mL (22 @ 10:20)  CARDIAC MARKERS ( 2022 10:20 )  x     / <0.01 ng/mL / x     / x     / x          RADIOLOGY:  ecg< from: 12 Lead ECG (22 @ 10:28) >  Diagnosis Line Normal sinus rhythm  Left bundle branch block  Abnormal ECG  < end of copied text >    cxr< from: Xray Chest 1 View- PORTABLE-Urgent (22 @ 11:26) >  Impression:  No focal pulmonary consolidation  --- End of Report ---  < end of copied text >

## 2022-06-16 NOTE — H&P ADULT - NS ATTEND AMEND GEN_ALL_CORE FT
#chest pain - rule out acs- with fixed/reversible defects on prior nuc stress - inadequate medical mngmt   tele  trend trops, echo  asa/statin  nuc stress in AM  #old LBBB  #HTN - cont home meds and adjust if remains elevated

## 2022-06-16 NOTE — ED ADULT NURSE NOTE - NSIMPLEMENTINTERV_GEN_ALL_ED
Implemented All Universal Safety Interventions:  [place] to call system. Call bell, personal items and telephone within reach. Instruct patient to call for assistance. Room bathroom lighting operational. Non-slip footwear when patient is off stretcher. Physically safe environment: no spills, clutter or unnecessary equipment. Stretcher in lowest position, wheels locked, appropriate side rails in place.

## 2022-06-16 NOTE — ED PROVIDER NOTE - ATTENDING APP SHARED VISIT CONTRIBUTION OF CARE
Patient complains of left-sided chest pain for several days.  She denies shortness of breath.  There is no change with exertion.  There is no change with movement of the trunk.  She denies fever, cough, trauma.  As per patient, she had a negative stress thallium within the last several months.  Vital signs noted.  Chest clear.  Heart regular rate 2/6 systolic murmur is present.  It is heard best in the right upper sternal border.  Abdomen is nontender extremity equal pulses EKG shows a left bundle branch block which is old as per patient.  Plan is to perform cardiac enzymes and attempt to obtain a copy of the recent stress thallium test.

## 2022-06-16 NOTE — ED PROVIDER NOTE - NS ED ATTENDING STATEMENT MOD
This was a shared visit with the TAYLOR. I reviewed and verified the documentation and independently performed the documented:

## 2022-06-16 NOTE — H&P ADULT - NSHPLABSRESULTS_GEN_ALL_CORE
12.3   3.95  )-----------( 219      ( 16 Jun 2022 10:20 )             38.2       06-16    140  |  104  |  12  ----------------------------<  90  3.8   |  23  |  0.7    Ca    9.7      16 Jun 2022 10:20    TPro  8.3<H>  /  Alb  5.0  /  TBili  0.5  /  DBili  x   /  AST  19  /  ALT  14  /  AlkPhos  61  06-16          CARDIAC MARKERS ( 16 Jun 2022 10:20 )  x     / <0.01 ng/mL / x     / x     / x        < from: Xray Chest 1 View- PORTABLE-Urgent (06.16.22 @ 11:26) >      < from: 12 Lead ECG (06.16.22 @ 10:28) >    Diagnosis Line Normal sinus rhythm  Left bundle branch block  Abnormal ECG

## 2022-06-16 NOTE — ED PROVIDER NOTE - NS ED ROS FT
Review of Systems    Constitutional: (-) fever/ chills (-)loss of appetite or  weight loss  Eyes (-) visual changes  ENT: (-) epistaxis (-) sore throat (-) ear pain  Cardiovascular: (+) chest pain, (-) syncope (-) palpitations  Respiratory: (-) cough, (-) shortness of breath  Gastrointestinal: (-) vomiting, (-) diarrhea (-) abdominal pain  : (-) dysuria , hematuria   neck: (-) neck pain or stiffness  Musculoskeletal:  (-) back pain, (-) joint pain   Integumentary: (-) rash, (-) swelling  Neurological: (-) headache, (-) altered mental status

## 2022-06-17 ENCOUNTER — TRANSCRIPTION ENCOUNTER (OUTPATIENT)
Age: 67
End: 2022-06-17

## 2022-06-17 VITALS
RESPIRATION RATE: 18 BRPM | SYSTOLIC BLOOD PRESSURE: 128 MMHG | DIASTOLIC BLOOD PRESSURE: 75 MMHG | HEART RATE: 65 BPM | TEMPERATURE: 97 F

## 2022-06-17 LAB
CHOLEST SERPL-MCNC: 207 MG/DL — HIGH
HCV AB S/CO SERPL IA: 0.07 COI — SIGNIFICANT CHANGE UP
HCV AB SERPL-IMP: SIGNIFICANT CHANGE UP
HDLC SERPL-MCNC: 65 MG/DL — SIGNIFICANT CHANGE UP
LIPID PNL WITH DIRECT LDL SERPL: 118 MG/DL — HIGH
NON HDL CHOLESTEROL: 142 MG/DL — HIGH
TRIGL SERPL-MCNC: 122 MG/DL — SIGNIFICANT CHANGE UP
TSH SERPL-MCNC: 0.79 UIU/ML — SIGNIFICANT CHANGE UP (ref 0.27–4.2)

## 2022-06-17 PROCEDURE — 99239 HOSP IP/OBS DSCHRG MGMT >30: CPT

## 2022-06-17 PROCEDURE — 93018 CV STRESS TEST I&R ONLY: CPT

## 2022-06-17 PROCEDURE — 78452 HT MUSCLE IMAGE SPECT MULT: CPT | Mod: 26

## 2022-06-17 PROCEDURE — 93010 ELECTROCARDIOGRAM REPORT: CPT

## 2022-06-17 PROCEDURE — 93016 CV STRESS TEST SUPVJ ONLY: CPT

## 2022-06-17 PROCEDURE — 99231 SBSQ HOSP IP/OBS SF/LOW 25: CPT

## 2022-06-17 RX ORDER — REGADENOSON 0.08 MG/ML
0.4 INJECTION, SOLUTION INTRAVENOUS ONCE
Refills: 0 | Status: COMPLETED | OUTPATIENT
Start: 2022-06-17 | End: 2022-06-17

## 2022-06-17 RX ORDER — METOPROLOL TARTRATE 50 MG
12.5 TABLET ORAL DAILY
Refills: 0 | Status: DISCONTINUED | OUTPATIENT
Start: 2022-06-17 | End: 2022-06-18

## 2022-06-17 RX ORDER — ATORVASTATIN CALCIUM 80 MG/1
40 TABLET, FILM COATED ORAL AT BEDTIME
Refills: 0 | Status: DISCONTINUED | OUTPATIENT
Start: 2022-06-17 | End: 2022-06-18

## 2022-06-17 RX ORDER — INFLUENZA VIRUS VACCINE 15; 15; 15; 15 UG/.5ML; UG/.5ML; UG/.5ML; UG/.5ML
0.5 SUSPENSION INTRAMUSCULAR ONCE
Refills: 0 | Status: COMPLETED | OUTPATIENT
Start: 2022-06-17 | End: 2022-06-18

## 2022-06-17 RX ADMIN — FAMOTIDINE 20 MILLIGRAM(S): 10 INJECTION INTRAVENOUS at 06:03

## 2022-06-17 RX ADMIN — REGADENOSON 0.4 MILLIGRAM(S): 0.08 INJECTION, SOLUTION INTRAVENOUS at 16:30

## 2022-06-17 RX ADMIN — AMLODIPINE BESYLATE 10 MILLIGRAM(S): 2.5 TABLET ORAL at 06:03

## 2022-06-17 RX ADMIN — ENOXAPARIN SODIUM 40 MILLIGRAM(S): 100 INJECTION SUBCUTANEOUS at 21:16

## 2022-06-17 RX ADMIN — Medication 50 MICROGRAM(S): at 06:03

## 2022-06-17 RX ADMIN — LOSARTAN POTASSIUM 100 MILLIGRAM(S): 100 TABLET, FILM COATED ORAL at 06:03

## 2022-06-17 RX ADMIN — Medication 1 MILLIGRAM(S): at 06:03

## 2022-06-17 RX ADMIN — ATORVASTATIN CALCIUM 40 MILLIGRAM(S): 80 TABLET, FILM COATED ORAL at 21:16

## 2022-06-17 NOTE — PROGRESS NOTE ADULT - SUBJECTIVE AND OBJECTIVE BOX
Subjective/Objective:     pt evaluated with Dr Oconnor this am feeling better today , mild intermittent pain under her breast non-exertional, denies sob, palpitation..    MEDICATIONS  (STANDING):  amLODIPine   Tablet 10 milliGRAM(s) Oral daily  atorvastatin 40 milliGRAM(s) Oral at bedtime  enoxaparin Injectable 40 milliGRAM(s) SubCutaneous every 24 hours  famotidine    Tablet 20 milliGRAM(s) Oral two times a day  influenza   Vaccine 0.5 milliLiter(s) IntraMuscular once  levothyroxine 50 MICROGram(s) Oral daily  loratadine 10 milliGRAM(s) Oral daily  losartan 100 milliGRAM(s) Oral daily  metoprolol succinate ER 12.5 milliGRAM(s) Oral daily  predniSONE   Tablet 1 milliGRAM(s) Oral daily  regadenoson Injectable 0.4 milliGRAM(s) IV Push once    MEDICATIONS  (PRN):  acetaminophen     Tablet .. 650 milliGRAM(s) Oral every 6 hours PRN Temp greater or equal to 38C (100.4F), Mild Pain (1 - 3)  aluminum hydroxide/magnesium hydroxide/simethicone Suspension 30 milliLiter(s) Oral every 4 hours PRN Dyspepsia  melatonin 3 milliGRAM(s) Oral at bedtime PRN Insomnia  ondansetron Injectable 4 milliGRAM(s) IV Push every 8 hours PRN Nausea and/or Vomiting          Vital Signs Last 24 Hrs  T(C): 36.4 (17 Jun 2022 06:00), Max: 36.7 (16 Jun 2022 21:17)  T(F): 97.6 (17 Jun 2022 06:00), Max: 98.1 (16 Jun 2022 21:17)  HR: 83 (17 Jun 2022 06:00) (64 - 83)  BP: 117/78 (17 Jun 2022 06:00) (117/78 - 170/79)  BP(mean): --  RR: 18 (17 Jun 2022 06:00) (18 - 18)  SpO2: 99% (17 Jun 2022 06:07) (98% - 100%)  I&O's Detail        GENERAL:  67y/o Female NAD, resting comfortably.  HEAD:  Atraumatic, Normocephalic  EYES: EOMI, PERRLA, conjunctiva and sclera clear  NECK: Supple, No JVD, no cervical lymphadenopathy, non-tender  CHEST/LUNG: Clear to auscultation bilaterally; No wheeze, rhonchi, or rales  HEART: Regular rate and rhythm; S1&S2  ABDOMEN: Soft, Nontender, Nondistended x 4 quadrants; Bowel sounds present  EXTREMITIES:   Peripheral Pulses Present, No clubbing, no cyanosis, or no edema, no calf tenderness  PSYCH: AAOx3, cooperative, appropriate  NEUROLOGY: WNL  SKIN: WNL        EKG/ TELEM:    LABS:                          12.3   3.95  )-----------( 219      ( 16 Jun 2022 10:20 )             38.2       16 Jun 2022 10:20    140    |  104    |  12     ----------------------------<  90     3.8     |  23     |  0.7      Ca    9.7        16 Jun 2022 10:20    TPro  8.3<H>  /  Alb  5.0    /  TBili  0.5    /  DBili  x      /  AST  19     /  ALT  14     /  AlkPhos  61     16 Jun 2022 10:20    CARDIAC MARKERS ( 16 Jun 2022 21:46 )  x     / <0.01 ng/mL / 74 U/L / x     / 1.5 ng/mL  CARDIAC MARKERS ( 16 Jun 2022 15:15 )  x     / <0.01 ng/mL / 78 U/L / x     / 1.8 ng/mL  CARDIAC MARKERS ( 16 Jun 2022 10:20 )  x     / <0.01 ng/mL / x     / x     / x            Creatine Kinase, Serum: 74 U/L (06-16-22 @ 21:46)  Creatine Kinase, Serum: 78 U/L (06-16-22 @ 15:15)              Diagnostic testing:  TTE  < from: TTE Echo Complete w/o Contrast w/ Doppler (06.16.22 @ 14:28) >  Summary:   1. Left ventricular ejection fraction, by visual estimation, is 55 to   60%.   2. Normal left atrial size.   3. There is no evidence of pericardial effusion.   4. Trace mitral valve regurgitation.   5. Moderate tricuspid regurgitation.   6. Normal trileaflet aortic valve with normal opening.   7. Estimated pulmonary artery systolic pressure is 38.5 mmHg assuming a   right atrial pressure of 3 mmHg, which is consistent with borderline   pulmonary hypertension.    < end of copied text >        Assessment and Plan:

## 2022-06-17 NOTE — DISCHARGE NOTE PROVIDER - NSDCCPCAREPLAN_GEN_ALL_CORE_FT
PRINCIPAL DISCHARGE DIAGNOSIS  Diagnosis: Chest pain  Assessment and Plan of Treatment: Acute cardiac event ruled out, follow up with own Cardiologist: Dr Vaughn

## 2022-06-17 NOTE — DISCHARGE NOTE PROVIDER - HOSPITAL COURSE
Patient is a 65yo female with a PMH of a LBBB, hypothyroidism, hypertension, and GERD presents to the ED with intermittent left sided chest pain for a week, being admitted to r/o ACS.    #chest pain, hx of LBBB  -  telemetry no arrythmia   -serial cardiac enzymes negative x 3 sets  -cardiology recommended stress test: .........  -echo: .............  -, statin    #hypothyroid  -c/w synthroid    #HTN  -low sodium diet  -monitored bp  -cont meds from home losartan and amlodipine    #GERD  famotidine    #Sjogren's   -c/w prednisone     Patient is a 67yo female with a PMH of a LBBB, hypothyroidism, hypertension, and GERD presents to the ED with intermittent left sided chest pain for a week, being admitted to r/o ACS.    #chest pain, hx of LBBB  -  telemetry no arrythmia   -serial cardiac enzymes negative x 3 sets  -cardiology recommended stress test: .........  -echo: 55-60%, moderate Tricuspid regurgitation  -, statin    #hypothyroid  -c/w synthroid    #HTN  -low sodium diet  -monitored bp  -cont meds from home losartan and amlodipine    #GERD  famotidine    #Sjogren's   -c/w prednisone     Patient is a 67yo female with a PMH of a LBBB, hypothyroidism, hypertension, and GERD presents to the ED with intermittent left sided chest pain for a week, being admitted to r/o ACS.    #chest pain, hx of LBBB  -  telemetry no arrythmia   -serial cardiac enzymes negative x 3 sets  -cardiology recommended stress test: .negative  -echo: 55-60%, moderate Tricuspid regurgitation  -, statin    #hypothyroid  -c/w synthroid    #HTN  -low sodium diet  -monitored bp  -cont meds from home losartan and amlodipine    #GERD  famotidine    #Sjogren's   -c/w prednisone  Hospital course, and discharge planning were discussed with patient,   in details.  all questions were answered.  seems to understand, and in agreement.  time 70 min       Patient is a 67yo female with a PMH of a LBBB, hypothyroidism, hypertension, and GERD presents to the ED with intermittent left sided chest pain for a week, being admitted to r/o ACS.    #chest pain, hx of LBBB  -  telemetry no arrythmia   -serial cardiac enzymes negative x 3 sets  -cardiology recommended stress test: .negative  -echo: 55-60%, moderate Tricuspid regurgitation  -, statin    #hypothyroid  -c/w synthroid    #HTN  -low sodium diet  -monitored bp  -cont meds from home losartan and amlodipine    #GERD  famotidine    #Sjogren's   -c/w prednisone  Hospital course, and discharge planning were discussed with patient,   in details.  all questions were answered.  seems to understand, and in agreement.  time 70 min      Patient was seen and examined today  feels better.  offers no complaints            Constitutional: No fever, fatigue or weight loss.  Skin: No rash.  Eyes: No recent vision problems or eye pain.  ENT: No congestion, ear pain, or sore throat.  Endocrine: No thyroid problems.  Cardiovascular: No chest pain or palpation.  Respiratory: No cough, shortness of breath, congestion, or wheezing.  Gastrointestinal: No abdominal pain, nausea, vomiting, or diarrhea.  Genitourinary: No dysuria.  Musculoskeletal: No joint swelling.  Neurologic: No headache.      Vital Signs Last 24 Hrs  T(C): 35.9 (06-17-22 @ 21:50), Max: 36.7 (06-17-22 @ 14:31)  T(F): 96.6 (06-17-22 @ 21:50), Max: 98 (06-17-22 @ 14:31)  HR: 65 (06-17-22 @ 21:50) (65 - 86)  BP: 128/75 (06-17-22 @ 21:50) (113/77 - 154/76)  BP(mean): --  RR: 18 (06-17-22 @ 21:50) (18 - 18)  SpO2: --        PHYSICAL EXAM-  GENERAL: NAD, well-groomed, well-developed  HEAD:  Atraumatic, Normocephalic  EYES: EOMI, PERRLA, conjunctiva and sclera clear  NECK: Supple, No JVD, Normal thyroid  NERVOUS SYSTEM:  Alert & Oriented X3, Motor Strength 5/5 B/L upper and lower extremities; DTRs 2+ intact and symmetric  CHEST/LUNG: Clear to percussion bilaterally; No rales, rhonchi, wheezing, or rubs  HEART: Regular rate and rhythm; No murmurs, rubs, or gallops  ABDOMEN: Soft, Nontender, Nondistended; Bowel sounds present  EXTREMITIES:  2+ Peripheral Pulses, No clubbing, cyanosis, or edema  SKIN: No rashes or lesions

## 2022-06-17 NOTE — DISCHARGE NOTE PROVIDER - CARE PROVIDERS DIRECT ADDRESSES
,[email or web address]

## 2022-06-17 NOTE — DISCHARGE NOTE PROVIDER - NSDCMRMEDTOKEN_GEN_ALL_CORE_FT
amLODIPine 10 mg oral tablet: 1 tab(s) orally once a day  Claritin 10 mg oral tablet: 1 tab(s) orally once a day  famotidine 20 mg oral tablet: 1 tab(s) orally 2 times a day  losartan 100 mg oral tablet: 1 tab(s) orally once a day  predniSONE 1 mg oral tablet: 1 tab(s) orally once a day  Synthroid 50 mcg (0.05 mg) oral tablet: 1 tab(s) orally once a day

## 2022-06-17 NOTE — DISCHARGE NOTE PROVIDER - PROVIDER TOKENS
PROVIDER:[TOKEN:[[id number]],FOLLOWUP:[Routine]],PROVIDER:[TOKEN:[[id number]],FOLLOWUP:[2 weeks]]

## 2022-06-17 NOTE — PROGRESS NOTE ADULT - SUBJECTIVE AND OBJECTIVE BOX
CC.  CP  HPI..  Patient reports that she feels better.  Offers no complaints               Constitutional: No fever, fatigue or weight loss.  Skin: No rash.  Eyes: No recent vision problems or eye pain.  ENT: No congestion, ear pain, or sore throat.  Endocrine: No thyroid problems.  Cardiovascular: No chest pain or palpation.  Respiratory: No cough, shortness of breath, congestion, or wheezing.  Gastrointestinal: No abdominal pain, nausea, vomiting, or diarrhea.  Genitourinary: No dysuria.  Musculoskeletal: No joint swelling.  Neurologic: No headache.      Vital Signs Last 24 Hrs  T(C): 36.4 (06-17-22 @ 06:00), Max: 36.7 (06-16-22 @ 21:17)  T(F): 97.6 (06-17-22 @ 06:00), Max: 98.1 (06-16-22 @ 21:17)  HR: 83 (06-17-22 @ 06:00) (64 - 83)  BP: 117/78 (06-17-22 @ 06:00) (117/78 - 170/79)  BP(mean): --  RR: 18 (06-17-22 @ 06:00) (18 - 18)  SpO2: 99% (06-17-22 @ 06:07) (98% - 100%)        PHYSICAL EXAM-  GENERAL: NAD, well-groomed, well-developed  HEAD:  Atraumatic, Normocephalic  EYES: EOMI, PERRLA, conjunctiva and sclera clear  NECK: Supple, No JVD, Normal thyroid  NERVOUS SYSTEM:  Alert & Oriented X3, Motor Strength 5/5 B/L upper and lower extremities; DTRs 2+ intact and symmetric  CHEST/LUNG: Clear to percussion bilaterally; No rales, rhonchi, wheezing, or rubs  HEART: Regular rate and rhythm; No murmurs, rubs, or gallops  ABDOMEN: Soft, Nontender, Nondistended; Bowel sounds present  EXTREMITIES:  2+ Peripheral Pulses, No clubbing, cyanosis, or edema  SKIN: No rashes or lesions                                  12.3   3.95  )-----------( 219      ( 16 Jun 2022 10:20 )             38.2     06-16    140  |  104  |  12  ----------------------------<  90  3.8   |  23  |  0.7    Ca    9.7      16 Jun 2022 10:20    TPro  8.3<H>  /  Alb  5.0  /  TBili  0.5  /  DBili  x   /  AST  19  /  ALT  14  /  AlkPhos  61  06-16    CARDIAC MARKERS ( 16 Jun 2022 21:46 )  x     / <0.01 ng/mL / 74 U/L / x     / 1.5 ng/mL  CARDIAC MARKERS ( 16 Jun 2022 15:15 )  x     / <0.01 ng/mL / 78 U/L / x     / 1.8 ng/mL  CARDIAC MARKERS ( 16 Jun 2022 10:20 )  x     / <0.01 ng/mL / x     / x     / x                      MEDICATIONS  (STANDING):  amLODIPine   Tablet 10 milliGRAM(s) Oral daily  enoxaparin Injectable 40 milliGRAM(s) SubCutaneous every 24 hours  famotidine    Tablet 20 milliGRAM(s) Oral two times a day  influenza   Vaccine 0.5 milliLiter(s) IntraMuscular once  levothyroxine 50 MICROGram(s) Oral daily  loratadine 10 milliGRAM(s) Oral daily  losartan 100 milliGRAM(s) Oral daily  metoprolol succinate ER 12.5 milliGRAM(s) Oral daily  predniSONE   Tablet 1 milliGRAM(s) Oral daily  regadenoson Injectable 0.4 milliGRAM(s) IV Push once    MEDICATIONS  (PRN):  acetaminophen     Tablet .. 650 milliGRAM(s) Oral every 6 hours PRN Temp greater or equal to 38C (100.4F), Mild Pain (1 - 3)  aluminum hydroxide/magnesium hydroxide/simethicone Suspension 30 milliLiter(s) Oral every 4 hours PRN Dyspepsia  melatonin 3 milliGRAM(s) Oral at bedtime PRN Insomnia  ondansetron Injectable 4 milliGRAM(s) IV Push every 8 hours PRN Nausea and/or Vomiting          RADIOLOGY RESULTS:

## 2022-06-17 NOTE — DISCHARGE NOTE PROVIDER - CARE PROVIDER_API CALL
James Hoffman (DO)  Internal Medicine  [address] [place] [address]  Phone: [phone]  Fax: [phone]  Follow Up Time: Routine    Serge Vaughn)  Cardiovascular Disease; Internal Medicine  [address] [place] [address]  Phone: [phone]  Fax: [phone]  Follow Up Time: 2 weeks

## 2022-06-18 ENCOUNTER — TRANSCRIPTION ENCOUNTER (OUTPATIENT)
Age: 67
End: 2022-06-18

## 2022-06-18 PROCEDURE — 99232 SBSQ HOSP IP/OBS MODERATE 35: CPT

## 2022-06-18 RX ADMIN — INFLUENZA VIRUS VACCINE 0.5 MILLILITER(S): 15; 15; 15; 15 SUSPENSION INTRAMUSCULAR at 09:33

## 2022-06-18 RX ADMIN — Medication 1 MILLIGRAM(S): at 06:45

## 2022-06-18 RX ADMIN — LOSARTAN POTASSIUM 100 MILLIGRAM(S): 100 TABLET, FILM COATED ORAL at 06:44

## 2022-06-18 RX ADMIN — Medication 50 MICROGRAM(S): at 06:44

## 2022-06-18 RX ADMIN — FAMOTIDINE 20 MILLIGRAM(S): 10 INJECTION INTRAVENOUS at 06:53

## 2022-06-18 RX ADMIN — AMLODIPINE BESYLATE 10 MILLIGRAM(S): 2.5 TABLET ORAL at 06:45

## 2022-06-18 NOTE — PROGRESS NOTE ADULT - ASSESSMENT
Patient is a 67yo female with a PMH of a LBBB, hypothyroidism, hypertension, and GERD presents to the ED with intermittent left sided chest pain for a week, being admitted to r/o ACS.    #chest pain, hx of LBBB  -  telemetry no arrythmia   -serial cardiac enzymes negative  -cardiology recommended stress test  -echo  -, statin    #hypothyroid  -c/w synthroid    #HTN  -low sodium diet  -monitor bp  -cont meds from home losartan and amlodipine    #GERD  famotidine    #Sjogren's   -c/w prednisone    #Progress Note Handoff  Pending (specify):  as above   Family discussion:  plan of care was discussed with patient  in details.  all questions were answered.  seems to understand, and in agreement  Disposition:  unknown      
Patient with no complaits. No chest pain or sob. She is ambulating. Adenocard thallium no ischemia EF 67%. F/U Dr Vaughn
67yo female with a PMH of a LBBB, hypothyroidism, hypertension, and gastric reflux presents to the ED with intermittent left sided chest pain for a week. Nuclear stress test from Sep 2021 consistent with apical infarction and small/moderate area moderate intensity inferoapical reversible defect without any interventions. Old LBBB. Admitted to telemetry.    Impression  *chest pain, r/o ACS    trop -x3  CK/CKMB  neg x2  ecg: LBBB, NSR  cxr: no consolidations  TTE normal LV function EF 55-60%    Plan:  pt with mild intermittent pain under her breast non-exertional, denies sob, palpitation..  - negative troponin, no new changes on ecg, htn in ed  - pending Adenosine thallium stress test today, If CP worse may need cardiac cath. Begin beta statin if needed asa.  - keep NPO except meds, no caffeinated beverages   - htn in ED, may contribute to chest pain -> c/w amlodipine 10mg and Losartan 100mg  - check tsh, lipids  - telemetry monitoring and daily ecg

## 2022-06-18 NOTE — DISCHARGE NOTE NURSING/CASE MANAGEMENT/SOCIAL WORK - NSDCVIVACCINE_GEN_ALL_CORE_FT
influenza, injectable, quadrivalent, preservative free; 18-Jun-2022 09:33; Hammad Walsh (RN); [employer]; [id number] (Exp. Date: 30-Jun-2022); IntraMuscular; Deltoid Right.; 0.5 milliLiter(s); VIS (VIS Published: 06-Aug-2021, VIS Presented: 18-Jun-2022);   Tdap; 10-Dec-2021 08:20; Nataliia Burrell (RN); [employer]; [id number] (Exp. Date: 09-Sep-2023); IntraMuscular; Deltoid Left.; 0.5 milliLiter(s); VIS (VIS Published: 09-May-2013, VIS Presented: 10-Dec-2021);

## 2022-06-18 NOTE — PROGRESS NOTE ADULT - SUBJECTIVE AND OBJECTIVE BOX
Patient is a 66y old  Female who presents with a chief complaint of Chest pain (17 Jun 2022 10:44)      T(F): 96.6 (06-17-22 @ 21:50), Max: 98 (06-17-22 @ 14:31)  HR: 65 (06-17-22 @ 21:50)  BP: 128/75 (06-17-22 @ 21:50)  RR: 18 (06-17-22 @ 21:50)  SpO2: --    PHYSICAL EXAM:  GENERAL: NAD, well-groomed, well-developed  HEAD:  Atraumatic, Normocephalic  EYES: EOMI, PERRLA, conjunctiva and sclera clear  ENMT: No tonsillar erythema, exudates, or enlargement; Moist mucous membranes, Good dentition, No lesions  NECK: Supple, No JVD, Normal thyroid  NERVOUS SYSTEM:  Alert & Oriented X3,  Motor Strength 5/5 B/L upper and lower extremities  CHEST/LUNG: Clear to percussion bilaterally; No rales, rhonchi, wheezing, or rubs  HEART: Regular rate and rhythm; No murmurs, rubs, or gallops  ABDOMEN: Soft, Nontender, Nondistended; Bowel sounds present  EXTREMITIES:   No clubbing, cyanosis, or edema  LYMPH: No lymphadenopathy noted  SKIN: No rashes or lesions    labs  06-16    140  |  104  |  12  ----------------------------<  90  3.8   |  23  |  0.7    Ca    9.7      16 Jun 2022 10:20    TPro  8.3<H>  /  Alb  5.0  /  TBili  0.5  /  DBili  x   /  AST  19  /  ALT  14  /  AlkPhos  61  06-16                          12.3   3.95  )-----------( 219      ( 16 Jun 2022 10:20 )             38.2               acetaminophen     Tablet .. 650 milliGRAM(s) Oral every 6 hours PRN  aluminum hydroxide/magnesium hydroxide/simethicone Suspension 30 milliLiter(s) Oral every 4 hours PRN  amLODIPine   Tablet 10 milliGRAM(s) Oral daily  atorvastatin 40 milliGRAM(s) Oral at bedtime  enoxaparin Injectable 40 milliGRAM(s) SubCutaneous every 24 hours  famotidine    Tablet 20 milliGRAM(s) Oral two times a day  influenza   Vaccine 0.5 milliLiter(s) IntraMuscular once  levothyroxine 50 MICROGram(s) Oral daily  loratadine 10 milliGRAM(s) Oral daily  losartan 100 milliGRAM(s) Oral daily  melatonin 3 milliGRAM(s) Oral at bedtime PRN  metoprolol succinate ER 12.5 milliGRAM(s) Oral daily  ondansetron Injectable 4 milliGRAM(s) IV Push every 8 hours PRN  predniSONE   Tablet 1 milliGRAM(s) Oral daily            ECHO:  < from: TTE Echo Complete w/o Contrast w/ Doppler (06.16.22 @ 14:28) >  Summary:   1. Left ventricular ejection fraction, by visual estimation, is 55 to   60%.   2. Normal left atrial size.   3. There is no evidence of pericardial effusion.   4. Trace mitral valve regurgitation.   5. Moderate tricuspid regurgitation.   6. Normal trileaflet aortic valve with normal opening.   7. Estimated pulmonary artery systolic pressure is 38.5 mmHg assuming a   right atrial pressure of 3 mmHg, which is consistent with borderline   pulmonary hypertension.    < end of copied text >            Stress: < from: NM Nuclear Stress Pharmacologic Multiple (06.17.22 @ 17:40) >  Impression:  1. NO EVIDENCE FOR ISCHEMIA DURING LEXISCAN INFUSION.  2. NORMAL RESTING LEFT VENTRICULAR WALL MOTION AND WALL THICKENING.  3. LEFT VENTRICULAR EJECTION FRACTION OF  67 % WHICH IS WITHIN RANGE OF   NORMAL.    --- End of Report ---    < end of copied text >

## 2022-06-18 NOTE — DISCHARGE NOTE NURSING/CASE MANAGEMENT/SOCIAL WORK - NSDCPEFALRISK_GEN_ALL_CORE
For information on Fall & Injury Prevention, visit: [email or web address] OR  [email or web address] OR  https://www.cdc.gov/steadi/patient.html

## 2022-06-18 NOTE — DISCHARGE NOTE NURSING/CASE MANAGEMENT/SOCIAL WORK - PATIENT PORTAL LINK FT
You can access the FollowMyHealth Patient Portal offered by [facility] by registering at the following website: [email or web address]. By joining [facility]’s FollowMyHealth portal, you will also be able to view your health information using other applications (apps) compatible with our system.

## 2022-06-23 DIAGNOSIS — I44.7 LEFT BUNDLE-BRANCH BLOCK, UNSPECIFIED: ICD-10-CM

## 2022-06-23 DIAGNOSIS — Z79.890 HORMONE REPLACEMENT THERAPY: ICD-10-CM

## 2022-06-23 DIAGNOSIS — Z96.641 PRESENCE OF RIGHT ARTIFICIAL HIP JOINT: ICD-10-CM

## 2022-06-23 DIAGNOSIS — R07.9 CHEST PAIN, UNSPECIFIED: ICD-10-CM

## 2022-06-23 DIAGNOSIS — Z79.52 LONG TERM (CURRENT) USE OF SYSTEMIC STEROIDS: ICD-10-CM

## 2022-06-23 DIAGNOSIS — Z88.0 ALLERGY STATUS TO PENICILLIN: ICD-10-CM

## 2022-06-23 DIAGNOSIS — M35.07 SJOGREN SYNDROME WITH CENTRAL NERVOUS SYSTEM INVOLVEMENT: ICD-10-CM

## 2022-06-23 DIAGNOSIS — Z88.6 ALLERGY STATUS TO ANALGESIC AGENT: ICD-10-CM

## 2022-06-23 DIAGNOSIS — K21.9 GASTRO-ESOPHAGEAL REFLUX DISEASE WITHOUT ESOPHAGITIS: ICD-10-CM

## 2022-06-23 DIAGNOSIS — I10 ESSENTIAL (PRIMARY) HYPERTENSION: ICD-10-CM

## 2022-06-23 DIAGNOSIS — E03.9 HYPOTHYROIDISM, UNSPECIFIED: ICD-10-CM

## 2022-06-23 DIAGNOSIS — Z23 ENCOUNTER FOR IMMUNIZATION: ICD-10-CM

## 2022-08-11 ENCOUNTER — APPOINTMENT (OUTPATIENT)
Dept: CARDIOLOGY | Facility: CLINIC | Age: 67
End: 2022-08-11

## 2022-08-11 PROCEDURE — 93000 ELECTROCARDIOGRAM COMPLETE: CPT

## 2022-08-11 PROCEDURE — 99214 OFFICE O/P EST MOD 30 MIN: CPT | Mod: 25

## 2022-11-30 ENCOUNTER — APPOINTMENT (OUTPATIENT)
Dept: CARDIOLOGY | Facility: CLINIC | Age: 67
End: 2022-11-30

## 2022-12-08 ENCOUNTER — APPOINTMENT (OUTPATIENT)
Dept: CARDIOLOGY | Facility: CLINIC | Age: 67
End: 2022-12-08

## 2022-12-21 ENCOUNTER — APPOINTMENT (OUTPATIENT)
Dept: CARDIOLOGY | Facility: CLINIC | Age: 67
End: 2022-12-21

## 2022-12-21 ENCOUNTER — APPOINTMENT (OUTPATIENT)
Dept: CARDIOLOGY | Facility: CLINIC | Age: 67
End: 2022-12-21
Payer: COMMERCIAL

## 2022-12-21 PROCEDURE — 93000 ELECTROCARDIOGRAM COMPLETE: CPT | Mod: 59

## 2022-12-21 PROCEDURE — 93246 EXT ECG>7D<15D RECORDING: CPT

## 2022-12-21 PROCEDURE — 99214 OFFICE O/P EST MOD 30 MIN: CPT | Mod: 25

## 2023-01-26 ENCOUNTER — APPOINTMENT (OUTPATIENT)
Dept: CARDIOLOGY | Facility: CLINIC | Age: 68
End: 2023-01-26
Payer: COMMERCIAL

## 2023-01-26 PROCEDURE — 99214 OFFICE O/P EST MOD 30 MIN: CPT

## 2023-01-26 PROCEDURE — 93306 TTE W/DOPPLER COMPLETE: CPT

## 2023-03-08 ENCOUNTER — EMERGENCY (EMERGENCY)
Facility: HOSPITAL | Age: 68
LOS: 0 days | Discharge: HOME | End: 2023-03-08
Attending: EMERGENCY MEDICINE
Payer: COMMERCIAL

## 2023-03-08 VITALS
SYSTOLIC BLOOD PRESSURE: 125 MMHG | OXYGEN SATURATION: 98 % | TEMPERATURE: 98 F | HEART RATE: 77 BPM | DIASTOLIC BLOOD PRESSURE: 72 MMHG | RESPIRATION RATE: 18 BRPM

## 2023-03-08 VITALS
DIASTOLIC BLOOD PRESSURE: 70 MMHG | HEIGHT: 59 IN | HEART RATE: 80 BPM | TEMPERATURE: 98 F | OXYGEN SATURATION: 97 % | WEIGHT: 126.1 LBS | RESPIRATION RATE: 16 BRPM | SYSTOLIC BLOOD PRESSURE: 136 MMHG

## 2023-03-08 DIAGNOSIS — Z98.891 HISTORY OF UTERINE SCAR FROM PREVIOUS SURGERY: Chronic | ICD-10-CM

## 2023-03-08 DIAGNOSIS — M19.90 UNSPECIFIED OSTEOARTHRITIS, UNSPECIFIED SITE: ICD-10-CM

## 2023-03-08 DIAGNOSIS — E03.9 HYPOTHYROIDISM, UNSPECIFIED: ICD-10-CM

## 2023-03-08 DIAGNOSIS — Z86.79 PERSONAL HISTORY OF OTHER DISEASES OF THE CIRCULATORY SYSTEM: ICD-10-CM

## 2023-03-08 DIAGNOSIS — M35.00 SJOGREN SYNDROME, UNSPECIFIED: ICD-10-CM

## 2023-03-08 DIAGNOSIS — I10 ESSENTIAL (PRIMARY) HYPERTENSION: ICD-10-CM

## 2023-03-08 DIAGNOSIS — Z87.09 PERSONAL HISTORY OF OTHER DISEASES OF THE RESPIRATORY SYSTEM: Chronic | ICD-10-CM

## 2023-03-08 DIAGNOSIS — Z88.0 ALLERGY STATUS TO PENICILLIN: ICD-10-CM

## 2023-03-08 DIAGNOSIS — M79.89 OTHER SPECIFIED SOFT TISSUE DISORDERS: ICD-10-CM

## 2023-03-08 DIAGNOSIS — M25.561 PAIN IN RIGHT KNEE: ICD-10-CM

## 2023-03-08 DIAGNOSIS — Z88.6 ALLERGY STATUS TO ANALGESIC AGENT: ICD-10-CM

## 2023-03-08 DIAGNOSIS — Z96.641 PRESENCE OF RIGHT ARTIFICIAL HIP JOINT: ICD-10-CM

## 2023-03-08 DIAGNOSIS — M71.21 SYNOVIAL CYST OF POPLITEAL SPACE [BAKER], RIGHT KNEE: ICD-10-CM

## 2023-03-08 DIAGNOSIS — K21.9 GASTRO-ESOPHAGEAL REFLUX DISEASE WITHOUT ESOPHAGITIS: ICD-10-CM

## 2023-03-08 DIAGNOSIS — K58.9 IRRITABLE BOWEL SYNDROME WITHOUT DIARRHEA: ICD-10-CM

## 2023-03-08 DIAGNOSIS — Z96.641 PRESENCE OF RIGHT ARTIFICIAL HIP JOINT: Chronic | ICD-10-CM

## 2023-03-08 PROCEDURE — 99284 EMERGENCY DEPT VISIT MOD MDM: CPT

## 2023-03-08 PROCEDURE — 99284 EMERGENCY DEPT VISIT MOD MDM: CPT | Mod: 25

## 2023-03-08 PROCEDURE — 93970 EXTREMITY STUDY: CPT | Mod: 26

## 2023-03-08 PROCEDURE — 93970 EXTREMITY STUDY: CPT

## 2023-03-08 NOTE — ED PROVIDER NOTE - ATTENDING APP SHARED VISIT CONTRIBUTION OF CARE
Patient is 67-year-old female with 2 days of atraumatic right knee pain and swelling.  She came to the ED with concern for DVT.  Denies any chest pain shortness of breath or syncope.    Exam: No joint effusion, no warmth, nontender bones, no pedal edema, no calf tenderness  Plan: Duplex

## 2023-03-08 NOTE — ED PROVIDER NOTE - PHYSICAL EXAMINATION
VITAL SIGNS: I have reviewed nursing notes and confirm.  CONSTITUTIONAL: Well-developed; well-nourished; in no acute distress.   SKIN: skin exam is warm and dry, no acute rash.    HEAD: Normocephalic; atraumatic.  EYES:  conjunctiva and sclera clear.  ENT: No nasal discharge; airway clear.  EXT: Mild TTP right knee, no redness, warmth, full ROM  NEURO: Alert, oriented, grossly unremarkable

## 2023-03-08 NOTE — ED PROVIDER NOTE - OBJECTIVE STATEMENT
Patient is a 67-year-old female here for evaluation of atraumatic right knee pain x2 weeks with associated swelling x2 days.  Patient denies fever, chills, knee replacement, diabetes, redness.

## 2023-03-08 NOTE — ED PROVIDER NOTE - PATIENT PORTAL LINK FT
You can access the FollowMyHealth Patient Portal offered by [facility] by registering at the following website: [email or web address]. By joining [employer]’s FollowMyHealth portal, you will also be able to view your health information using other applications (apps) compatible with our system.

## 2023-03-08 NOTE — ED PROVIDER NOTE - NSICDXFAMILYHX_GEN_ALL_CORE_FT
FAMILY HISTORY:  Father  Still living? No  FH: kidney failure, Age at diagnosis: Age Unknown    Mother  Still living? Yes, Estimated age: Age Unknown  FH: HTN (hypertension), Age at diagnosis: Age Unknown     Patient

## 2023-03-08 NOTE — ED ADULT NURSE NOTE - NSIMPLEMENTINTERV_GEN_ALL_ED
Implemented All Universal Safety Interventions:  [place] to call system. Call bell, personal items and telephone within reach. Instruct patient to call for assistance. Room bathroom lighting operational. Non-slip footwear when patient is off stretcher. Physically safe environment: no spills, clutter or unnecessary equipment. Stretcher in lowest position, wheels locked, appropriate side rails in place.

## 2023-04-12 NOTE — H&P PST ADULT - AIRWAY
Patient : Bing Zee Age: 86 year old Sex: female   MRN: [id number] Encounter Date: 4/12/2023      History     Chief Complaint   Patient presents with   • Shortness of Breath     86-year-old female history of hypertension, high cholesterol, atrial fibrillation on Xarelto s/p pacemaker, CHF with the EF 45% on echo 01/20/2023, coronary artery disease, chronic kidney disease presents to the emergency room for shortness of breath.  Patient states for the last few months she has had dyspnea on exertion and orthopnea.  She was given extra Lasix doses by her cardiologist and has had marked improvement in her shortness of breath over the past 1 month.  Last night while the patient was laying down for bed she developed increased shortness of breath.  She also noted an episode of left-sided chest pain described as aching lasting 10 minutes.  The chest pain occurred while at rest.  The chest pain did not radiate.  She denied any associated diaphoresis, palpitations, lightheadedness or syncope.  No leg swelling.  Patient denies any cough, congestion, fevers or chills.  No leg swelling, hemoptysis, recent travel.  She is on Xarelto and takes her medication regularly.    Chart Review: I reviewed the patient's medications, allergies, and past medical and surgical history in [facility]. ED Triage Note reviewed.  Echo 1/2023 EF 45%          Allergies   Allergen Reactions   • Ciprofloxacin DIZZINESS   • Keflex PRURITUS   • Mirabegron DIZZINESS     Took 1 pill in May 2022 and caused severe dizziness   • Metronidazole NAUSEA     Nausea    • Oxycodone Other (See Comments)     itching       Current Discharge Medication List      Prior to Admission Medications    Details   spironolactone (ALDACTONE) 25 MG tablet Take 1 tablet by mouth every other day.  Qty: 90 tablet, Refills: 0      acetaminophen (TYLENOL) 325 MG tablet Take 650 mg by mouth every 6 hours as needed for Pain.      metoPROLOL succinate (TOPROL-XL) 25 MG 24 hr tablet Take 1 tablet  by mouth nightly.  Qty: 90 tablet, Refills: 3      Xarelto 15 MG Tab TAKE 1 TABLET NIGHTLY  Qty: 90 tablet, Refills: 3      linaclotide (LINZESS) 145 MCG capsule Take 1 capsule by mouth as needed (constipation).  Qty: 90 capsule, Refills: 0      atorvastatin (LIPITOR) 20 MG tablet Take 1 tablet by mouth daily.  Qty: 90 tablet, Refills: 3      furosemide (Lasix) 40 MG tablet Take 1 tablet by mouth daily.  Qty: 90 tablet, Refills: 3      trospium 60 MG extended-release capsule Take 1 capsule by mouth daily.  Qty: 30 capsule, Refills: 11      Synthroid 125 MCG tablet daily.      silver sulfADIAZINE (THERMAZENE) 1 % cream Apply 1 application topically 2 times daily. Apply to a thickness of 1/16 inch (\"nickel thick\"). To clean vulvar lesion  Qty: 50 g, Refills: 1      lidocaine (XYLOCAINE) 5 % ointment Apply topically as needed for Pain.  Qty: 240 g, Refills: 11      Ferrous Sulfate Dried 200 (65 Fe) MG Tab Take 200 mg by mouth daily.  Qty: 100 tablet, Refills: 0      Cholecalciferol (vitamin D3) 25 mcg (1,000 units) capsule Take 25 mcg by mouth daily.      brimonidine (ALPHAGAN) 0.2 % ophthalmic solution Place 1 drop into both eyes 2 times daily.  Qty: 5 mL, Refills: 12      dorzolamide-timolol (COSOPT) 22.3-6.8 MG/ML ophthalmic solution Place 1 drop into both eyes 2 times daily.  Qty: 10 mL, Refills: 12             Past Medical History:   Diagnosis Date   •  LEFT ROTATOR CUFF TEAR CHRONIC 03/23/2010   • Anemia    • Anxiety    • Atrial fibrillation (CMD)    • Bronchitis    • Cervical dysplasia    • Chronic kidney disease     \"mass on kidney\"   • Coronary artery disease    • Degeneration of lumbar or lumbosacral intervertebral disc 08/07/2003    L5-S1   • Degeneration of thoracic or thoracolumbar intervertebral disc 08/25/2003   • Difficult intravenous access     per patient   • Difficulty in walking(719.7)    • Disorder of bone and cartilage, unspecified 08/01/2005   • Enthesopathy of hip region 10/24/2008    L hip   •  HTN (hypertension)    • Iatrogenic hypothyroidism     Thyroidectomy for multinodular goiter and subclinical hyperthyroidism.   • Nontraumatic rupture of tendons of biceps (long head) 04/22/2010   • Other and unspecified hyperlipidemia    • Pneumonia    • PONV (postoperative nausea and vomiting)    • Primary localized osteoarthrosis, pelvic region and thigh 10/24/2008    L hip   • Seasonal allergies     Sneezing, runny nose   • Secondary localized osteoarthrosis, shoulder region 05/22/2008    L shoulder   • Shortness of breath on exertion    • Shoulder joint replacement by other means 02/27/2009   • Sleep apnea     pt refused cpap   • Tear of lateral cartilage or meniscus of knee, current 04/08/2011   • Tear of medial cartilage or meniscus of knee, current 4/8/2011   • Thoracic or lumbosacral neuritis or radiculitis, unspecified 08/07/2003   • Tibialis posterior dysfunction    • Unspecified sinusitis (chronic)    • Vaginal cancer (CMD) 03/20/2019    radiation therapy   • Vitamin D deficiency    • Wears dentures    • Wears glasses        Past Surgical History:   Procedure Laterality Date   • ABLATION AVN MODIFICATION  12/06/2022   • CARPAL TUNNEL RELEASE  03/25/2003    Carpal Tunnel   • COLONOSCOPY DIAGNOSTIC  09/17/2013    Affi 5yr recall, polyps tubular adenoma 2018   • DEXA BONE DENSITY AXIAL SKELETON  08/01/2005   • EYE SURGERY      cataract   • GASTRIC BYPASS  1980s   • HYSTERECTOMY     • IR CRYO-ABLATION  11/01/2019   • IR KIDNEY BIOPSY  03/27/2019   • KNEE SCOPE,SHAVE ARTICULAR CART  04/21/2011     left knee   • PELVIC EXAMINATION W ANESTH  5/7/2020; 6/8/2020; 7/15/2020    necrotic vulvar tissue; had radiation   • PELVIC EXAMINATION W ANESTH  02/25/2021    colposcopy, biopsies   • RECONSTRUCT PROX HUMERAL IMPLANT  05/22/2008    R shoulder   • REMOVAL GALLBLADDER     • REVERSE TOTAL SHOULDER ARTHROPLASTY  03/27/2014    left   • THYROID SURGERY     • TOTAL ABDOMINAL HYSTERECTOMY W/ BILATERAL SALPINGOOPHORECTOMY   2007    Records reviewed. For carcinoma in situ.        Family History   Problem Relation Age of Onset   • Heart Mother         Triple Bypass   • Heart Father         Heart attack   • Heart disease Father    • Systemic Lupus Erythematosus Daughter    • Leukemia Maternal Grandmother    • Cancer Neg Hx        Social History     Tobacco Use   • Smoking status: Former     Packs/day: 0.50     Years: 20.00     Pack years: 10.00     Types: Cigarettes     Quit date: 1989     Years since quittin.2   • Smokeless tobacco: Never   Vaping Use   • Vaping Use: never used   Substance Use Topics   • Alcohol use: No     Alcohol/week: 0.0 standard drinks   • Drug use: No       E-cigarette/Vaping   • E-Cigarette/Vaping Use Never Used    • Passive Exposure No    • Counseling Given No      E-Cigarette/Vaping Substances & Devices   • Nicotine No    • THC No    • CBD No    • Flavoring No    • Disposable No    • Pre-filled or Refillable Cartridge No    • Refillable Tank No    • Pre-filled Pod No        Review of Systems   Constitutional: Negative for chills and fever.   HENT: Negative for congestion.    Eyes: Negative for visual disturbance.   Respiratory: Positive for shortness of breath.    Cardiovascular: Positive for chest pain. Negative for leg swelling.   Gastrointestinal: Negative for abdominal pain, diarrhea, nausea and vomiting.   Genitourinary: Negative for dysuria.   Musculoskeletal: Negative for neck pain and neck stiffness.   Skin: Negative for rash and wound.   Neurological: Negative for weakness and light-headedness.   All other systems reviewed and are negative.      Physical Exam     ED Triage Vitals   ED Triage Vitals Group      Temp 23 1321 97.6 °F (36.4 °C)      Heart Rate 23 1321 96      Resp 23 1321 20      BP 23 1323 (!) 143/91      SpO2 23 1321 100 %      EtCO2 mmHg --       Height 23 1321 5' 1\" (1.549 m)      Weight 23 1321 173 lb 1 oz (78.5 kg)      Weight Scale  Used --       BMI (Calculated) 04/12/23 1321 32.7      IBW/kg (Calculated) 04/12/23 1321 47.8       Physical Exam  Vitals and nursing note reviewed.   Constitutional:       General: She is not in acute distress.     Appearance: She is well-developed. She is not diaphoretic.      Comments: Patient is resting comfortably, no acute distress.   HENT:      Head: Normocephalic and atraumatic.   Eyes:      General:         Right eye: No discharge.         Left eye: No discharge.      Pupils: Pupils are equal, round, and reactive to light.   Neck:      Trachea: No tracheal deviation.   Cardiovascular:      Rate and Rhythm: Normal rate and regular rhythm.      Heart sounds: Murmur heard.    Systolic murmur is present with a grade of 2/6.     Comments: 2+ radial and DP pulses, equal bilaterally.  Pulmonary:      Effort: Pulmonary effort is normal. No respiratory distress.      Breath sounds: Examination of the right-lower field reveals rales. Examination of the left-lower field reveals rales. Rales present. No decreased breath sounds, wheezing or rhonchi.      Comments: Patient is speaking full sentences.  No acute respiratory distress.  Faint crackles to bilateral bases.  Chest:      Chest wall: No tenderness.   Abdominal:      General: Bowel sounds are normal. There is no distension.      Palpations: Abdomen is soft. There is no mass.      Tenderness: There is no abdominal tenderness. There is no guarding or rebound.   Musculoskeletal:         General: No deformity. Normal range of motion.      Cervical back: Normal range of motion and neck supple.      Comments: Calves are equal in size bilaterally.  No calf swelling, erythema, warmth, tenderness to palpation, palpable cords.     Skin:     General: Skin is warm and dry.   Neurological:      Mental Status: She is alert and oriented to person, place, and time.      GCS: GCS eye subscore is 4. GCS verbal subscore is 5. GCS motor subscore is 6.      Cranial Nerves: No cranial  nerve deficit.      Sensory: No sensory deficit.      Motor: No abnormal muscle tone.      Coordination: Coordination normal.      Comments: Strength is 5 out of 5 in upper and lower extremities, equal bilaterally. Sensation to gross touch is intact in upper and lower extremities, equal bilaterally.   Psychiatric:         Behavior: Behavior normal.         ED Course     Procedures    Lab Results     Results for orders placed or performed during the hospital encounter of 04/12/23   Comprehensive Metabolic Panel   Result Value Ref Range    Fasting Status      Sodium 141 135 - 145 mmol/L    Potassium 4.3 3.4 - 5.1 mmol/L    Chloride 107 97 - 110 mmol/L    Carbon Dioxide 22 21 - 32 mmol/L    Anion Gap 16 7 - 19 mmol/L    Glucose 90 70 - 99 mg/dL    BUN 25 (H) 6 - 20 mg/dL    Creatinine 1.33 (H) 0.51 - 0.95 mg/dL    Glomerular Filtration Rate 39 (L) >=60    BUN/Cr 19 7 - 25    Calcium 9.3 8.4 - 10.2 mg/dL    Bilirubin, Total 0.9 0.2 - 1.0 mg/dL    GOT/AST 26 <=37 Units/L    GPT/ALT 17 <64 Units/L    Alkaline Phosphatase 92 45 - 117 Units/L    Albumin 3.3 (L) 3.6 - 5.1 g/dL    Protein, Total 7.4 6.4 - 8.2 g/dL    Globulin 4.1 (H) 2.0 - 4.0 g/dL    A/G Ratio 0.8 (L) 1.0 - 2.4   TROPONIN I, HIGH SENSITIVITY   Result Value Ref Range    Troponin I, High Sensitivity 31 <52 ng/L   NT proBNP   Result Value Ref Range    NT-proBNP 5,475 (H) <=450 pg/mL   CBC with Automated Differential (performable only)   Result Value Ref Range    WBC 5.2 4.2 - 11.0 K/mcL    RBC 2.92 (L) 4.00 - 5.20 mil/mcL    HGB 9.1 (L) 12.0 - 15.5 g/dL    HCT 28.1 (L) 36.0 - 46.5 %    MCV 96.2 78.0 - 100.0 fl    MCH 31.2 26.0 - 34.0 pg    MCHC 32.4 32.0 - 36.5 g/dL    RDW-CV 15.3 (H) 11.0 - 15.0 %    RDW-SD 54.2 (H) 39.0 - 50.0 fL     140 - 450 K/mcL    NRBC 0 <=0 /100 WBC    Neutrophil, Percent 67 %    Lymphocytes, Percent 20 %    Mono, Percent 10 %    Eosinophils, Percent 1 %    Basophils, Percent 2 %    Immature Granulocytes 0 %    Absolute  Neutrophils 3.5 1.8 - 7.7 K/mcL    Absolute Lymphocytes 1.0 1.0 - 4.0 K/mcL    Absolute Monocytes 0.5 0.3 - 0.9 K/mcL    Absolute Eosinophils  0.1 0.0 - 0.5 K/mcL    Absolute Basophils 0.1 0.0 - 0.3 K/mcL    Absolute Immature Granulocytes 0.0 0.0 - 0.2 K/mcL       EKG Results     Per my review of the EKG tracing, my findings are:    Rate: 70  Rhythm: paced  QTc:   Abnormalities: no acute ischemic changes from prevoius      Radiology Results     Imaging Results          XR Chest AP or PA (Final result)  Result time 04/12/23 15:17:50    Final result                 Impression:    IMPRESSION: Small left pleural effusion and mild pulmonary venous  hypertension, consistent with chronic CHF.             Narrative:    EXAM:  XR CHEST AP OR PA    DATE OF EXAM: 4/12/2023 2:45 PM    86 years-old Female with presenting history of sob.     ADDITIONAL HISTORY:  None.    COMPARISON: January 11, 2023, December 10, 2022.    Portable upright chest x-ray was obtained. Cardiac monitoring leads are  partially obscuring. A cardiac recording device projects over the heart.    The heart is mildly enlarged, unchanged. The pulmonary vessels are mildly  cephalized. There is blunting of left costophrenic angle, suggesting small  pleural effusion. Lungs are generally clear.                                ED Medication Orders (From admission, onward)    None               MDM     [age] female presenting to the ER with an episode of chest pain and shortness of breath last night while at rest.  She still feels short of breath but this is been a progressively improving symptoms for the last few months.  She is no leg swelling, no hypoxia, awaiting BNP and chest x-ray.  Her chest pain was greater than 6 hours ago and lasted 10-15 minutes.  I will obtain a troponin.  Her initial EKG is paced rhythm with no ST changes.    Patient is endorsed to Dr. Ayala pending labs, cxr and reassessment.    Clinical Impression     ED Diagnosis   1.  Chest pain, unspecified type        2. Shortness of breath        3. History of CHF (congestive heart failure)            Disposition        Discharge 4/12/2023  3:49 PM  Bing Zee discharge to home/self care.           Eileen Roger MD  04/12/23 [id number]  Patient remained stable throughout her ED stay.  Patient's numbers are essentially at baseline.  Patient will be discharged with instructions to follow up in the outpatient setting and return as needed for worsening symptoms.       Abhilash Ayala DO  04/12/23 8023     FROM neck/normal

## 2023-05-09 ENCOUNTER — APPOINTMENT (OUTPATIENT)
Dept: CARDIOLOGY | Facility: CLINIC | Age: 68
End: 2023-05-09
Payer: COMMERCIAL

## 2023-05-09 DIAGNOSIS — R42 DIZZINESS AND GIDDINESS: ICD-10-CM

## 2023-05-09 DIAGNOSIS — R94.31 ABNORMAL ELECTROCARDIOGRAM [ECG] [EKG]: ICD-10-CM

## 2023-05-09 PROCEDURE — 93000 ELECTROCARDIOGRAM COMPLETE: CPT

## 2023-05-09 PROCEDURE — 99214 OFFICE O/P EST MOD 30 MIN: CPT | Mod: 25

## 2023-05-10 PROBLEM — R94.31 ABNORMAL ECG: Status: ACTIVE | Noted: 2022-06-10

## 2023-05-10 PROBLEM — R42 DIZZINESS AND GIDDINESS: Status: ACTIVE | Noted: 2023-05-10

## 2023-06-27 NOTE — H&P PST ADULT - BMI (KG/M2)
Pt returned from CT in stable condition. Pt ambulatory to the bathroom with walker with a steady gait. 27.7

## 2023-07-10 NOTE — ED ADULT NURSE NOTE - CHPI ED NUR SYMPTOMS POS
Past Medical History:   Diagnosis Date   • Colon polyp 08/15/2022    Tubular Adenoma/  ([facility])   • Generalized osteoarthrosis, unspecified site     back, hips & knees   • GERD (gastroesophageal reflux disease)    • Goiter, specified as simple     multinodular   • H/O thyroidectomy    • Headache(784.0)    • Herpes simplex type 1 infection     uses Valtrex   • Hyperlipidemia 12/02/2011   • Hypertension    • Insomnia     on nortriptyline   • Low back pain    • Other and unspecified hyperlipidemia    • Prediabetes    • Vitamin D deficiency 12/02/2011        CHEST DISCOMFORT/CHEST PAIN

## 2023-07-27 ENCOUNTER — APPOINTMENT (OUTPATIENT)
Dept: CARDIOLOGY | Facility: CLINIC | Age: 68
End: 2023-07-27
Payer: COMMERCIAL

## 2023-07-27 PROCEDURE — 93880 EXTRACRANIAL BILAT STUDY: CPT

## 2023-07-27 PROCEDURE — 93978 VASCULAR STUDY: CPT

## 2023-08-03 ENCOUNTER — APPOINTMENT (OUTPATIENT)
Dept: CARDIOLOGY | Facility: CLINIC | Age: 68
End: 2023-08-03
Payer: COMMERCIAL

## 2023-08-03 PROCEDURE — 99214 OFFICE O/P EST MOD 30 MIN: CPT | Mod: 25

## 2023-08-03 PROCEDURE — 93000 ELECTROCARDIOGRAM COMPLETE: CPT

## 2023-09-03 ENCOUNTER — RX RENEWAL (OUTPATIENT)
Age: 68
End: 2023-09-03

## 2023-09-04 RX ORDER — ATORVASTATIN CALCIUM 10 MG/1
10 TABLET, FILM COATED ORAL
Qty: 90 | Refills: 3 | Status: ACTIVE | COMMUNITY
Start: 2022-08-11 | End: 1900-01-01

## 2023-11-02 ENCOUNTER — APPOINTMENT (OUTPATIENT)
Dept: CARDIOLOGY | Facility: CLINIC | Age: 68
End: 2023-11-02
Payer: COMMERCIAL

## 2023-11-02 PROCEDURE — 93000 ELECTROCARDIOGRAM COMPLETE: CPT

## 2023-11-02 PROCEDURE — 99214 OFFICE O/P EST MOD 30 MIN: CPT | Mod: 25

## 2023-11-10 ENCOUNTER — APPOINTMENT (OUTPATIENT)
Dept: SURGERY | Facility: CLINIC | Age: 68
End: 2023-11-10

## 2023-12-16 ENCOUNTER — EMERGENCY (EMERGENCY)
Facility: HOSPITAL | Age: 68
LOS: 0 days | Discharge: HOME | End: 2023-12-17
Attending: EMERGENCY MEDICINE
Payer: COMMERCIAL

## 2023-12-16 VITALS
TEMPERATURE: 98 F | WEIGHT: 121.03 LBS | SYSTOLIC BLOOD PRESSURE: 169 MMHG | RESPIRATION RATE: 17 BRPM | DIASTOLIC BLOOD PRESSURE: 81 MMHG | HEART RATE: 96 BPM | OXYGEN SATURATION: 100 % | HEIGHT: 59 IN

## 2023-12-16 DIAGNOSIS — M35.00 SJOGREN SYNDROME, UNSPECIFIED: ICD-10-CM

## 2023-12-16 DIAGNOSIS — R42 DIZZINESS AND GIDDINESS: ICD-10-CM

## 2023-12-16 DIAGNOSIS — Z88.6 ALLERGY STATUS TO ANALGESIC AGENT: ICD-10-CM

## 2023-12-16 DIAGNOSIS — K58.9 IRRITABLE BOWEL SYNDROME WITHOUT DIARRHEA: ICD-10-CM

## 2023-12-16 DIAGNOSIS — Z88.0 ALLERGY STATUS TO PENICILLIN: ICD-10-CM

## 2023-12-16 DIAGNOSIS — R51.9 HEADACHE, UNSPECIFIED: ICD-10-CM

## 2023-12-16 DIAGNOSIS — Z98.891 HISTORY OF UTERINE SCAR FROM PREVIOUS SURGERY: Chronic | ICD-10-CM

## 2023-12-16 DIAGNOSIS — Z87.09 PERSONAL HISTORY OF OTHER DISEASES OF THE RESPIRATORY SYSTEM: Chronic | ICD-10-CM

## 2023-12-16 DIAGNOSIS — K21.9 GASTRO-ESOPHAGEAL REFLUX DISEASE WITHOUT ESOPHAGITIS: ICD-10-CM

## 2023-12-16 DIAGNOSIS — Z96.641 PRESENCE OF RIGHT ARTIFICIAL HIP JOINT: Chronic | ICD-10-CM

## 2023-12-16 DIAGNOSIS — I10 ESSENTIAL (PRIMARY) HYPERTENSION: ICD-10-CM

## 2023-12-16 PROCEDURE — 96375 TX/PRO/DX INJ NEW DRUG ADDON: CPT

## 2023-12-16 PROCEDURE — 96374 THER/PROPH/DIAG INJ IV PUSH: CPT

## 2023-12-16 PROCEDURE — 99284 EMERGENCY DEPT VISIT MOD MDM: CPT

## 2023-12-16 PROCEDURE — 70450 CT HEAD/BRAIN W/O DYE: CPT | Mod: 26,MA

## 2023-12-16 PROCEDURE — 70450 CT HEAD/BRAIN W/O DYE: CPT | Mod: MA

## 2023-12-16 PROCEDURE — 99284 EMERGENCY DEPT VISIT MOD MDM: CPT | Mod: 25

## 2023-12-16 RX ORDER — SODIUM CHLORIDE 9 MG/ML
1000 INJECTION INTRAMUSCULAR; INTRAVENOUS; SUBCUTANEOUS ONCE
Refills: 0 | Status: COMPLETED | OUTPATIENT
Start: 2023-12-16 | End: 2023-12-16

## 2023-12-16 RX ORDER — DIPHENHYDRAMINE HCL 50 MG
25 CAPSULE ORAL ONCE
Refills: 0 | Status: COMPLETED | OUTPATIENT
Start: 2023-12-16 | End: 2023-12-16

## 2023-12-16 RX ORDER — METOCLOPRAMIDE HCL 10 MG
10 TABLET ORAL ONCE
Refills: 0 | Status: COMPLETED | OUTPATIENT
Start: 2023-12-16 | End: 2023-12-16

## 2023-12-16 RX ADMIN — SODIUM CHLORIDE 1000 MILLILITER(S): 9 INJECTION INTRAMUSCULAR; INTRAVENOUS; SUBCUTANEOUS at 23:53

## 2023-12-16 RX ADMIN — Medication 104 MILLIGRAM(S): at 23:53

## 2023-12-16 RX ADMIN — Medication 25 MILLIGRAM(S): at 23:53

## 2023-12-16 NOTE — ED ADULT NURSE NOTE - NSFALLUNIVINTERV_ED_ALL_ED
Bed/Stretcher in lowest position, wheels locked, appropriate side rails in place/Call bell, personal items and telephone in reach/Instruct patient to call for assistance before getting out of bed/chair/stretcher/Non-slip footwear applied when patient is off stretcher/[place] to call system/Physically safe environment - no spills, clutter or unnecessary equipment/Purposeful proactive rounding/Room/bathroom lighting operational, light cord in reach Bed/Stretcher in lowest position, wheels locked, appropriate side rails in place/Call bell, personal items and telephone in reach/Instruct patient to call for assistance before getting out of bed/chair/stretcher/Non-slip footwear applied when patient is off stretcher/[place] to call system/Physically safe environment - no spills, clutter or unnecessary equipment/Purposeful proactive rounding/Room/bathroom lighting operational, light cord in reach

## 2023-12-16 NOTE — ED ADULT NURSE NOTE - NS PRO PASSIVE SMOKE EXP
Detail Level: Zone Plan: discussed options. offered shave removal. with available current literature, feel comfortable monitoring it. did discuss thses nevi are marker for increased risk of melanoma. Unknown

## 2023-12-17 VITALS
OXYGEN SATURATION: 97 % | RESPIRATION RATE: 18 BRPM | TEMPERATURE: 98 F | DIASTOLIC BLOOD PRESSURE: 68 MMHG | SYSTOLIC BLOOD PRESSURE: 110 MMHG | HEART RATE: 87 BPM

## 2023-12-17 RX ORDER — PROCHLORPERAZINE MALEATE 5 MG
1 TABLET ORAL
Qty: 20 | Refills: 0
Start: 2023-12-17

## 2023-12-17 NOTE — ED PROVIDER NOTE - CARE PROVIDERS DIRECT ADDRESSES
,DirectAddress_Unknown,DirectAddress_Unknown,[email or web address] ,DirectAddress_Unknown,DirectAddress_Unknown,[email or web address]

## 2023-12-17 NOTE — ED PROVIDER NOTE - ATTENDING APP SHARED VISIT CONTRIBUTION OF CARE
I have personally performed a history and physical exam on this patient and personally directed the management of the patient. PosteriorPatient is a 67-year-old presents for evaluation of elevated blood pressure history of Sjogren's headache onset of the past 24 to 48 hours  Pressure at home was 180/100 changes chest pain shortness of breath abdominal pain back pain weakness numbness tingling fevers chills vomiting or diarrhea    On physical exam patient is normocephalic atraumatic pupils equal reactive examination extraocular muscles intact oropharynx clear chest station soft nontender nondistended bowel sounds positive no guarding or rebound extremities full range of motion    Assessment plan patient presents for evaluation of headache and hypertension will obtain CT head given diphenhydramine Reglan as well as IV fluid bolus patient has normal neurologic exam with no focal deficits no fevers no chills most likely infectious cause states that she had this before he was a known sudden onset headache no visual changes with a normal exam not consistent with vascular etiology signed out to Dr. Gutiérrez for further workup

## 2023-12-17 NOTE — ED PROVIDER NOTE - PROVIDER TOKENS
PROVIDER:[TOKEN:[[id number]]],PROVIDER:[TOKEN:[[id number]]],PROVIDER:[TOKEN:[[id number]]] PROVIDER:[TOKEN:[[id number]]],PROVIDER:[TOKEN:[[id number]]],PROVIDER:[TOKEN:[[id number]]]

## 2023-12-17 NOTE — ED PROVIDER NOTE - PATIENT PORTAL LINK FT
You can access the FollowMyHealth Patient Portal offered by [facility] by registering at the following website: [email or web address]. By joining [employer]’s FollowMyHealth portal, you will also be able to view your health information using other applications (apps) compatible with our system. You can access the FollowMyHealth Patient Portal offered by [facility] by registering at the following website: [email or web address]. By joining [employer]’s FollowMyHealth portal, you will also be able to view your health information using other applications (apps) compatible with our system.

## 2023-12-17 NOTE — ED PROVIDER NOTE - CLINICAL SUMMARY MEDICAL DECISION MAKING FREE TEXT BOX
67-year-old female, history of HTN, GERD, IBS, here in ED for headache and elevated BP.  Exam unremarkable, neuro nonfocal.  Head CT negative.  Given IV fluids, Benadryl and Reglan with improvement.  Will DC to follow-up with neurology.

## 2023-12-17 NOTE — ED PROVIDER NOTE - CARE PROVIDER_API CALL
Temo Padron  Neurology  [address] [place] [address]  Phone: [phone]  Fax: [phone]  Follow Up Time:     Humphrey Palacios  Neurology  [facility] [place] [address]  Phone: [phone]  Fax: [phone]  Follow Up Time:     Maciel Calix  Neurology  [address] [place] [address] [address]  [place] [address]  Phone: [phone]  Fax: [phone]  Follow Up Time:    Temo Padron  Neurology  [address] [place] [address]  Phone: [phone]  Fax: [phone]  Follow Up Time:     Humphrey Palacios  Neurology  [facility] [place] [address]  Phone: [phone]  Fax: [phone]  Follow Up Time:     Maciel Calix  Neurology  [address] [place] [address] [address]  [place] [address]  Phone: [phone]  Fax: [phone]  Follow Up Time:

## 2023-12-17 NOTE — ED PROVIDER NOTE - OBJECTIVE STATEMENT
66 years old female history of hypertension, Sjogren's syndrome present complaint posterior headache/dizziness started all day today.  Reports her check her blood pressure at home it was 180/100 so she comes to ED for evaluation.  Headache is mostly posterior.  Dizziness is mild spinning sensation.  Denies recent fall or head injury.  Denies nausea, light sensitivity.  Further denies fever, chills, recent illness, chest pain, shortness of breath, abdominal pain, vomiting and diarrhea.

## 2023-12-17 NOTE — ED PROVIDER NOTE - NSFOLLOWUPINSTRUCTIONS_ED_ALL_ED_FT
Headache    A headache is pain or discomfort felt around the head or neck area. The specific cause of a headache may not be found as there are many types including tension headaches, migraine headaches, and cluster headaches. Watch your condition for any changes. Things you can do to manage your pain include taking over the counter and prescription medications as instructed by your health care provider, lying down in a dark quiet room, limiting stress, getting regular sleep, and refraining from alcohol and tobacco products.    SEEK IMMEDIATE MEDICAL CARE IF YOU EXPERIENCE THE FOLLOWING SYMPTOMS: fever, vomiting, stiff neck, loss of vision, problems with speech, muscle weakness, loss of balance, trouble walking, pass out, or confusion.     Dizziness    Dizziness is a common problem. It is a feeling of unsteadiness or light-headedness. You may feel like you are about to faint. This condition can be caused by a number of things, including medicines, dehydration, or illness. Drink enough fluid to keep your urine clear or pale yellow. Do not drink alcohol and limit your caffeine and salt intake. Avoid quick movement.  Rise slowly from chairs and steady yourself until you feel okay. In the morning, first sit up on the side of the bed.    SEEK IMMEDIATE MEDICAL CARE IF YOU HAVE THE FOLLOWING SYMPTOMS: vomiting, changes in your vision or speech, weakness in your arms or legs, trouble speaking or swallowing, chest pain, abdominal pain, shortness of breath, sweating, bleeding, headache, neck pain, or fever.

## 2024-03-06 ENCOUNTER — APPOINTMENT (OUTPATIENT)
Dept: SURGERY | Facility: CLINIC | Age: 69
End: 2024-03-06

## 2024-04-26 ENCOUNTER — APPOINTMENT (OUTPATIENT)
Dept: SURGERY | Facility: CLINIC | Age: 69
End: 2024-04-26
Payer: COMMERCIAL

## 2024-04-26 ENCOUNTER — APPOINTMENT (OUTPATIENT)
Dept: CARDIOLOGY | Facility: CLINIC | Age: 69
End: 2024-04-26

## 2024-04-26 VITALS
SYSTOLIC BLOOD PRESSURE: 120 MMHG | BODY MASS INDEX: 22.82 KG/M2 | TEMPERATURE: 97.7 F | HEIGHT: 62 IN | OXYGEN SATURATION: 98 % | HEART RATE: 88 BPM | WEIGHT: 124 LBS | DIASTOLIC BLOOD PRESSURE: 80 MMHG

## 2024-04-26 DIAGNOSIS — K22.70 BARRETT'S ESOPHAGUS W/OUT DYSPLASIA: ICD-10-CM

## 2024-04-26 DIAGNOSIS — K21.9 DIAPHRAGMATIC HERNIA W/OUT OBSTRUCTION OR GANGRENE: ICD-10-CM

## 2024-04-26 DIAGNOSIS — K44.9 DIAPHRAGMATIC HERNIA W/OUT OBSTRUCTION OR GANGRENE: ICD-10-CM

## 2024-04-26 PROCEDURE — 99204 OFFICE O/P NEW MOD 45 MIN: CPT

## 2024-04-28 PROBLEM — K22.70 BARRETT'S ESOPHAGUS WITHOUT DYSPLASIA: Status: ACTIVE | Noted: 2024-04-28

## 2024-04-28 PROBLEM — K44.9 HIATAL HERNIA WITH GERD: Status: ACTIVE | Noted: 2024-04-28

## 2024-04-28 RX ORDER — PREDNISONE 1 MG/1
1 TABLET ORAL
Qty: 90 | Refills: 0 | Status: COMPLETED | COMMUNITY
Start: 2023-10-27

## 2024-04-28 RX ORDER — DENOSUMAB 60 MG/ML
60 INJECTION SUBCUTANEOUS
Qty: 1 | Refills: 0 | Status: ACTIVE | COMMUNITY
Start: 2024-01-17

## 2024-04-28 RX ORDER — PREDNISONE 10 MG
TABLET ORAL
Refills: 0 | Status: DISCONTINUED | COMMUNITY
End: 2024-04-28

## 2024-04-28 NOTE — HISTORY OF PRESENT ILLNESS
[de-identified] : 69 yo female with intractable GERD on maximum medication and recent evidence of barretts esophagus and a moderate sized hiatal hernia. She see Dr Stewart. She recently had an UGI as well in [place], but we do not have the results of this.  Her medical hx includes an arrhythmia that she takes metoprolol for and sees Dr Vaughn for. She also has a history of a pneumothorax treated with a chest tube and then a VATS 13 years ago (on the right side.)  She is otherwise very active. She is an  and does have to do some heavy lifting at work, but would have the ability to work from home if necessary.

## 2024-04-28 NOTE — PLAN
[FreeTextEntry1] : Discussed surgical vs medical mgmt and the risks/benefits of both. Described the surgery of a robotic HHR with lowell choudhary. Discussed with patient the risks and benefits of surgery including but not limited to bleeding, infection, damage to surrounding structures, recurrent or new hernia, slipped wrap, risk of anesthesia and death. The patient understands and wishes to undergo the proposed surgery.   We also discussed in detail the post-op diet, We also discussed post-op lifting restrictions and will plan to have her work from home for 6 weeks post op.  She is planning to go on a cruise in July and will see her back in August to plan surgery for her pre-op visit. In the months between she will obtain clearance from her cardiologist and her PCP.  All her questions were answered. Follow up appt scheduled.

## 2024-04-28 NOTE — PHYSICAL EXAM
[No Rash or Lesion] : No rash or lesion [Alert] : alert [Oriented to Person] : oriented to person [Oriented to Place] : oriented to place [Calm] : calm [de-identified] : no distress [de-identified] : nonlabored breathing  [de-identified] : nontachycardic [de-identified] : soft, nontender, thin

## 2024-05-02 ENCOUNTER — APPOINTMENT (OUTPATIENT)
Dept: CARDIOLOGY | Facility: CLINIC | Age: 69
End: 2024-05-02
Payer: COMMERCIAL

## 2024-05-02 VITALS
SYSTOLIC BLOOD PRESSURE: 128 MMHG | BODY MASS INDEX: 22.45 KG/M2 | OXYGEN SATURATION: 99 % | WEIGHT: 122 LBS | DIASTOLIC BLOOD PRESSURE: 78 MMHG | HEIGHT: 62 IN | HEART RATE: 76 BPM

## 2024-05-02 DIAGNOSIS — R00.2 PALPITATIONS: ICD-10-CM

## 2024-05-02 DIAGNOSIS — I10 ESSENTIAL (PRIMARY) HYPERTENSION: ICD-10-CM

## 2024-05-02 DIAGNOSIS — E78.5 HYPERLIPIDEMIA, UNSPECIFIED: ICD-10-CM

## 2024-05-02 DIAGNOSIS — R01.1 CARDIAC MURMUR, UNSPECIFIED: ICD-10-CM

## 2024-05-02 DIAGNOSIS — Z87.898 PERSONAL HISTORY OF OTHER SPECIFIED CONDITIONS: ICD-10-CM

## 2024-05-02 PROCEDURE — 99214 OFFICE O/P EST MOD 30 MIN: CPT | Mod: 25

## 2024-05-02 PROCEDURE — 93000 ELECTROCARDIOGRAM COMPLETE: CPT

## 2024-05-02 PROCEDURE — G2211 COMPLEX E/M VISIT ADD ON: CPT | Mod: NC,1L

## 2024-05-02 RX ORDER — CLONAZEPAM 0.5 MG/1
0.5 TABLET ORAL
Qty: 30 | Refills: 0 | Status: DISCONTINUED | COMMUNITY
Start: 2024-01-31 | End: 2024-05-02

## 2024-05-02 RX ORDER — ESOMEPRAZOLE MAGNESIUM 40 MG/1
40 GRANULE, DELAYED RELEASE ORAL
Refills: 0 | Status: DISCONTINUED | COMMUNITY
End: 2024-05-02

## 2024-05-02 RX ORDER — LEVOTHYROXINE SODIUM 0.07 MG/1
75 TABLET ORAL DAILY
Refills: 0 | Status: ACTIVE | COMMUNITY

## 2024-05-02 RX ORDER — DULOXETINE HYDROCHLORIDE 30 MG/1
30 CAPSULE, DELAYED RELEASE PELLETS ORAL
Qty: 194 | Refills: 0 | Status: DISCONTINUED | COMMUNITY
Start: 2024-01-31 | End: 2024-05-02

## 2024-05-02 RX ORDER — AMLODIPINE BESYLATE 10 MG/1
10 TABLET ORAL DAILY
Refills: 0 | Status: ACTIVE | COMMUNITY

## 2024-05-02 RX ORDER — PROCHLORPERAZINE MALEATE 10 MG/1
10 TABLET ORAL
Qty: 20 | Refills: 0 | Status: DISCONTINUED | COMMUNITY
Start: 2023-12-17 | End: 2024-05-02

## 2024-05-02 RX ORDER — OMEPRAZOLE 40 MG/1
40 CAPSULE, DELAYED RELEASE ORAL DAILY
Refills: 0 | Status: ACTIVE | COMMUNITY
Start: 2024-03-21

## 2024-05-02 RX ORDER — AZITHROMYCIN 250 MG/1
250 TABLET, FILM COATED ORAL
Qty: 4 | Refills: 0 | Status: DISCONTINUED | COMMUNITY
Start: 2024-04-17 | End: 2024-05-02

## 2024-05-02 RX ORDER — LOSARTAN POTASSIUM 100 MG/1
100 TABLET, FILM COATED ORAL DAILY
Refills: 0 | Status: ACTIVE | COMMUNITY

## 2024-05-02 RX ORDER — FAMOTIDINE 20 MG/1
20 TABLET, FILM COATED ORAL 4 TIMES DAILY
Refills: 0 | Status: ACTIVE | COMMUNITY

## 2024-05-02 NOTE — PHYSICAL EXAM
[Well Developed] : well developed [Well Nourished] : well nourished [No Acute Distress] : no acute distress [Normal Conjunctiva] : normal conjunctiva [Normal Venous Pressure] : normal venous pressure [No Carotid Bruit] : no carotid bruit [Normal S1, S2] : normal S1, S2 [No Rub] : no rub [No Gallop] : no gallop [Clear Lung Fields] : clear lung fields [Good Air Entry] : good air entry [No Respiratory Distress] : no respiratory distress  [Soft] : abdomen soft [Non Tender] : non-tender [Normal Bowel Sounds] : normal bowel sounds [Normal Gait] : normal gait [No Edema] : no edema [No Cyanosis] : no cyanosis [No Clubbing] : no clubbing [Moves all extremities] : moves all extremities [No Focal Deficits] : no focal deficits [Normal Speech] : normal speech [Alert and Oriented] : alert and oriented [Normal memory] : normal memory [de-identified] : 2/6 mid systolic murmur RUSB

## 2024-05-02 NOTE — REVIEW OF SYSTEMS
[Palpitations] : palpitations [Heartburn] : heartburn [Joint Pain] : joint pain [Anxiety] : anxiety [Under Stress] : under stress [Negative] : Heme/Lymph

## 2024-05-02 NOTE — ASSESSMENT
[FreeTextEntry1] : --- 68-year-old female with a history of hypertension, hyperlipidemia, left bundle branch block, anxiety, hypothyroidism, Carrington's esophagus who presents for follow-up.  Hypertension -Continue amlodipine 10 mg daily and losartan 100 mg daily  Hyperlipidemia -Continue atorvastatin 10 mg daily -Discussed lifestyle modification such as continued aerobic activity and a heart healthy diet  Preoperative assessment -She can reliably perform greater than 4 METS without any exertional symptoms, chest pain or shortness of breath. She has no symptoms of angina, CHF or history of significant arrhythmia. She may proceed with surgery without any further cardiac testing.  RTC in 6 months.

## 2024-05-07 ENCOUNTER — NON-APPOINTMENT (OUTPATIENT)
Age: 69
End: 2024-05-07

## 2024-08-23 ENCOUNTER — APPOINTMENT (OUTPATIENT)
Dept: SURGERY | Facility: CLINIC | Age: 69
End: 2024-08-23
Payer: COMMERCIAL

## 2024-08-23 VITALS
SYSTOLIC BLOOD PRESSURE: 128 MMHG | HEIGHT: 62 IN | WEIGHT: 122 LBS | TEMPERATURE: 97 F | HEART RATE: 76 BPM | BODY MASS INDEX: 22.45 KG/M2 | DIASTOLIC BLOOD PRESSURE: 84 MMHG | OXYGEN SATURATION: 98 %

## 2024-08-23 DIAGNOSIS — K44.9 DIAPHRAGMATIC HERNIA W/OUT OBSTRUCTION OR GANGRENE: ICD-10-CM

## 2024-08-23 DIAGNOSIS — K22.70 BARRETT'S ESOPHAGUS W/OUT DYSPLASIA: ICD-10-CM

## 2024-08-23 DIAGNOSIS — K21.9 DIAPHRAGMATIC HERNIA W/OUT OBSTRUCTION OR GANGRENE: ICD-10-CM

## 2024-08-23 PROCEDURE — 99215 OFFICE O/P EST HI 40 MIN: CPT

## 2024-08-29 NOTE — PHYSICAL EXAM
[No Rash or Lesion] : No rash or lesion [Alert] : alert [Oriented to Person] : oriented to person [Oriented to Place] : oriented to place [Calm] : calm [de-identified] : no distress [de-identified] : nonlabored breathing  [de-identified] : nontachycardic [de-identified] : soft, nontender, thin

## 2024-08-29 NOTE — HISTORY OF PRESENT ILLNESS
[de-identified] : 69 yo female with intractable GERD on maximum medication and recent evidence of barretts esophagus and a moderate sized hiatal hernia. She sees Dr Stewart. She recently had an UGI as well in [place], but we do not have the results of this.  Her medical hx includes an arrhythmia that she takes metoprolol for and sees Dr Vaughn for. She also has a history of a pneumothorax treated with a chest tube and then a VATS 13 years ago (on the right side.)  She is otherwise very active. She is an  and does have to do some heavy lifting at work, but would have the ability to work from home if necessary.   8/23: She returns today to re-discuss surgery. Her UGI was reviewed from last year with normal peristalsis and GERD. She still has significant GERD symptoms and would like to not be on he medication forever. She contyinues to be interested in a HHR. She has returned from her cruise and she is ready to book surgery now.

## 2024-08-29 NOTE — PLAN
[FreeTextEntry1] : Discussed surgery again and described the procedure, robotic HHR with lowell choudhary. Discussed with patient the risks and benefits of surgery including but not limited to bleeding, infection, damage to surrounding structures, slipped wrap and recurrent hernia, pneumothorax, risk of anesthesia and death. The patient understands and wishes to undergo the proposed surgery.  Patient had no additional questions. I discussed that she would stay one night on the hospital for monitoring and advancement of diet. I went over the post-op soft diet and gave her post-op instructions.

## 2024-09-18 ENCOUNTER — NON-APPOINTMENT (OUTPATIENT)
Age: 69
End: 2024-09-18

## 2024-10-11 ENCOUNTER — OUTPATIENT (OUTPATIENT)
Dept: OUTPATIENT SERVICES | Facility: HOSPITAL | Age: 69
LOS: 1 days | End: 2024-10-11
Payer: COMMERCIAL

## 2024-10-11 DIAGNOSIS — Z96.641 PRESENCE OF RIGHT ARTIFICIAL HIP JOINT: Chronic | ICD-10-CM

## 2024-10-11 DIAGNOSIS — Z87.09 PERSONAL HISTORY OF OTHER DISEASES OF THE RESPIRATORY SYSTEM: Chronic | ICD-10-CM

## 2024-10-11 DIAGNOSIS — Z98.891 HISTORY OF UTERINE SCAR FROM PREVIOUS SURGERY: Chronic | ICD-10-CM

## 2024-10-11 DIAGNOSIS — Z02.9 ENCOUNTER FOR ADMINISTRATIVE EXAMINATIONS, UNSPECIFIED: ICD-10-CM

## 2024-10-11 PROBLEM — Z87.39 PERSONAL HISTORY OF OTHER DISEASES OF THE MUSCULOSKELETAL SYSTEM AND CONNECTIVE TISSUE: Chronic | Status: ACTIVE | Noted: 2024-10-03

## 2024-10-11 PROBLEM — K44.9 DIAPHRAGMATIC HERNIA WITHOUT OBSTRUCTION OR GANGRENE: Chronic | Status: ACTIVE | Noted: 2024-10-03

## 2024-10-11 LAB
APTT BLD: 40.4 SEC — HIGH (ref 27–39.2)
INR BLD: 1.09 RATIO — SIGNIFICANT CHANGE UP (ref 0.65–1.3)
PROTHROM AB SERPL-ACNC: 12.4 SEC — SIGNIFICANT CHANGE UP (ref 9.95–12.87)

## 2024-10-11 PROCEDURE — 36415 COLL VENOUS BLD VENIPUNCTURE: CPT

## 2024-10-11 PROCEDURE — 85610 PROTHROMBIN TIME: CPT

## 2024-10-11 PROCEDURE — 85730 THROMBOPLASTIN TIME PARTIAL: CPT

## 2024-10-12 DIAGNOSIS — Z02.9 ENCOUNTER FOR ADMINISTRATIVE EXAMINATIONS, UNSPECIFIED: ICD-10-CM

## 2024-10-16 NOTE — ASU PATIENT PROFILE, ADULT - NSICDXPASTMEDICALHX_GEN_ALL_CORE_FT
PAST MEDICAL HISTORY:  GERD (gastroesophageal reflux disease)     H/O left bundle branch block     H/O pneumothorax repaired with pleurodesis    H/O: osteoarthritis     Hiatal hernia     History of IBS     History of osteoporosis     HTN (hypertension)     Hypothyroid     Sjogren's disease

## 2024-10-17 ENCOUNTER — INPATIENT (INPATIENT)
Facility: HOSPITAL | Age: 69
LOS: 0 days | Discharge: HOME | DRG: 328 | End: 2024-10-18
Attending: STUDENT IN AN ORGANIZED HEALTH CARE EDUCATION/TRAINING PROGRAM | Admitting: STUDENT IN AN ORGANIZED HEALTH CARE EDUCATION/TRAINING PROGRAM
Payer: COMMERCIAL

## 2024-10-17 VITALS
TEMPERATURE: 98 F | RESPIRATION RATE: 17 BRPM | DIASTOLIC BLOOD PRESSURE: 69 MMHG | SYSTOLIC BLOOD PRESSURE: 151 MMHG | WEIGHT: 126.1 LBS | HEIGHT: 59 IN | OXYGEN SATURATION: 100 % | HEART RATE: 89 BPM

## 2024-10-17 DIAGNOSIS — Z87.09 PERSONAL HISTORY OF OTHER DISEASES OF THE RESPIRATORY SYSTEM: Chronic | ICD-10-CM

## 2024-10-17 DIAGNOSIS — Z96.641 PRESENCE OF RIGHT ARTIFICIAL HIP JOINT: Chronic | ICD-10-CM

## 2024-10-17 DIAGNOSIS — K22.70 BARRETT'S ESOPHAGUS WITHOUT DYSPLASIA: ICD-10-CM

## 2024-10-17 DIAGNOSIS — Z98.891 HISTORY OF UTERINE SCAR FROM PREVIOUS SURGERY: Chronic | ICD-10-CM

## 2024-10-17 LAB
ANION GAP SERPL CALC-SCNC: 13 MMOL/L — SIGNIFICANT CHANGE UP (ref 7–14)
BASOPHILS # BLD AUTO: 0.01 K/UL — SIGNIFICANT CHANGE UP (ref 0–0.2)
BASOPHILS NFR BLD AUTO: 0.2 % — SIGNIFICANT CHANGE UP (ref 0–1)
BUN SERPL-MCNC: 9 MG/DL — LOW (ref 10–20)
CALCIUM SERPL-MCNC: 9.4 MG/DL — SIGNIFICANT CHANGE UP (ref 8.4–10.5)
CHLORIDE SERPL-SCNC: 100 MMOL/L — SIGNIFICANT CHANGE UP (ref 98–110)
CO2 SERPL-SCNC: 23 MMOL/L — SIGNIFICANT CHANGE UP (ref 17–32)
CREAT SERPL-MCNC: 0.6 MG/DL — LOW (ref 0.7–1.5)
EGFR: 99 ML/MIN/1.73M2 — SIGNIFICANT CHANGE UP
EOSINOPHIL # BLD AUTO: 0 K/UL — SIGNIFICANT CHANGE UP (ref 0–0.7)
EOSINOPHIL NFR BLD AUTO: 0 % — SIGNIFICANT CHANGE UP (ref 0–8)
GLUCOSE SERPL-MCNC: 111 MG/DL — HIGH (ref 70–99)
HCT VFR BLD CALC: 35.1 % — LOW (ref 37–47)
HGB BLD-MCNC: 11.7 G/DL — LOW (ref 12–16)
IMM GRANULOCYTES NFR BLD AUTO: 0.4 % — HIGH (ref 0.1–0.3)
LYMPHOCYTES # BLD AUTO: 0.72 K/UL — LOW (ref 1.2–3.4)
LYMPHOCYTES # BLD AUTO: 15.1 % — LOW (ref 20.5–51.1)
MAGNESIUM SERPL-MCNC: 1.7 MG/DL — LOW (ref 1.8–2.4)
MCHC RBC-ENTMCNC: 30.2 PG — SIGNIFICANT CHANGE UP (ref 27–31)
MCHC RBC-ENTMCNC: 33.3 G/DL — SIGNIFICANT CHANGE UP (ref 32–37)
MCV RBC AUTO: 90.7 FL — SIGNIFICANT CHANGE UP (ref 81–99)
MONOCYTES # BLD AUTO: 0.23 K/UL — SIGNIFICANT CHANGE UP (ref 0.1–0.6)
MONOCYTES NFR BLD AUTO: 4.8 % — SIGNIFICANT CHANGE UP (ref 1.7–9.3)
NEUTROPHILS # BLD AUTO: 3.78 K/UL — SIGNIFICANT CHANGE UP (ref 1.4–6.5)
NEUTROPHILS NFR BLD AUTO: 79.5 % — HIGH (ref 42.2–75.2)
NRBC # BLD: 0 /100 WBCS — SIGNIFICANT CHANGE UP (ref 0–0)
PHOSPHATE SERPL-MCNC: 3.3 MG/DL — SIGNIFICANT CHANGE UP (ref 2.1–4.9)
PLATELET # BLD AUTO: 235 K/UL — SIGNIFICANT CHANGE UP (ref 130–400)
PMV BLD: 10.2 FL — SIGNIFICANT CHANGE UP (ref 7.4–10.4)
POTASSIUM SERPL-MCNC: 4.1 MMOL/L — SIGNIFICANT CHANGE UP (ref 3.5–5)
POTASSIUM SERPL-SCNC: 4.1 MMOL/L — SIGNIFICANT CHANGE UP (ref 3.5–5)
RBC # BLD: 3.87 M/UL — LOW (ref 4.2–5.4)
RBC # FLD: 11.9 % — SIGNIFICANT CHANGE UP (ref 11.5–14.5)
SODIUM SERPL-SCNC: 136 MMOL/L — SIGNIFICANT CHANGE UP (ref 135–146)
WBC # BLD: 4.76 K/UL — LOW (ref 4.8–10.8)
WBC # FLD AUTO: 4.76 K/UL — LOW (ref 4.8–10.8)

## 2024-10-17 PROCEDURE — 84100 ASSAY OF PHOSPHORUS: CPT

## 2024-10-17 PROCEDURE — C9399: CPT

## 2024-10-17 PROCEDURE — C9290: CPT

## 2024-10-17 PROCEDURE — 83735 ASSAY OF MAGNESIUM: CPT

## 2024-10-17 PROCEDURE — 85025 COMPLETE CBC W/AUTO DIFF WBC: CPT

## 2024-10-17 PROCEDURE — 43281 LAP PARAESOPHAG HERN REPAIR: CPT

## 2024-10-17 PROCEDURE — S2900 ROBOTIC SURGICAL SYSTEM: CPT | Mod: NC

## 2024-10-17 PROCEDURE — 43235 EGD DIAGNOSTIC BRUSH WASH: CPT

## 2024-10-17 PROCEDURE — 36415 COLL VENOUS BLD VENIPUNCTURE: CPT

## 2024-10-17 PROCEDURE — 80048 BASIC METABOLIC PNL TOTAL CA: CPT

## 2024-10-17 RX ORDER — AMLODIPINE BESYLATE 5 MG
10 TABLET ORAL DAILY
Refills: 0 | Status: DISCONTINUED | OUTPATIENT
Start: 2024-10-17 | End: 2024-10-18

## 2024-10-17 RX ORDER — LOSARTAN POTASSIUM 100 MG/1
100 TABLET, FILM COATED ORAL DAILY
Refills: 0 | Status: DISCONTINUED | OUTPATIENT
Start: 2024-10-17 | End: 2024-10-18

## 2024-10-17 RX ORDER — ONDANSETRON HCL/PF 4 MG/2 ML
4 VIAL (ML) INJECTION EVERY 6 HOURS
Refills: 0 | Status: DISCONTINUED | OUTPATIENT
Start: 2024-10-17 | End: 2024-10-18

## 2024-10-17 RX ORDER — HYDROMORPHONE HYDROCHLORIDE 1 MG/ML
0.5 INJECTION, SOLUTION INTRAMUSCULAR; INTRAVENOUS; SUBCUTANEOUS
Refills: 0 | Status: DISCONTINUED | OUTPATIENT
Start: 2024-10-17 | End: 2024-10-17

## 2024-10-17 RX ORDER — ENOXAPARIN SODIUM 150 MG/ML
40 INJECTION SUBCUTANEOUS EVERY 24 HOURS
Refills: 0 | Status: DISCONTINUED | OUTPATIENT
Start: 2024-10-17 | End: 2024-10-18

## 2024-10-17 RX ORDER — INFLUENZA VIRUS VACCINE 15; 15; 15; 15 UG/.5ML; UG/.5ML; UG/.5ML; UG/.5ML
0.5 SUSPENSION INTRAMUSCULAR ONCE
Refills: 0 | Status: DISCONTINUED | OUTPATIENT
Start: 2024-10-17 | End: 2024-10-18

## 2024-10-17 RX ORDER — ATORVASTATIN CALCIUM 10 MG/1
10 TABLET, FILM COATED ORAL AT BEDTIME
Refills: 0 | Status: DISCONTINUED | OUTPATIENT
Start: 2024-10-17 | End: 2024-10-18

## 2024-10-17 RX ORDER — SODIUM CHLORIDE IRRIG SOLUTION 0.9 %
1000 SOLUTION, IRRIGATION IRRIGATION
Refills: 0 | Status: DISCONTINUED | OUTPATIENT
Start: 2024-10-17 | End: 2024-10-18

## 2024-10-17 RX ORDER — GABAPENTIN 800 MG/1
100 TABLET, FILM COATED ORAL THREE TIMES A DAY
Refills: 0 | Status: DISCONTINUED | OUTPATIENT
Start: 2024-10-17 | End: 2024-10-18

## 2024-10-17 RX ORDER — HYDROMORPHONE HYDROCHLORIDE 1 MG/ML
0.5 INJECTION, SOLUTION INTRAMUSCULAR; INTRAVENOUS; SUBCUTANEOUS EVERY 6 HOURS
Refills: 0 | Status: DISCONTINUED | OUTPATIENT
Start: 2024-10-17 | End: 2024-10-18

## 2024-10-17 RX ORDER — SODIUM CHLORIDE IRRIG SOLUTION 0.9 %
1000 SOLUTION, IRRIGATION IRRIGATION
Refills: 0 | Status: DISCONTINUED | OUTPATIENT
Start: 2024-10-17 | End: 2024-10-17

## 2024-10-17 RX ADMIN — ATORVASTATIN CALCIUM 10 MILLIGRAM(S): 10 TABLET, FILM COATED ORAL at 21:47

## 2024-10-17 RX ADMIN — GABAPENTIN 100 MILLIGRAM(S): 800 TABLET, FILM COATED ORAL at 21:47

## 2024-10-17 RX ADMIN — ENOXAPARIN SODIUM 40 MILLIGRAM(S): 150 INJECTION SUBCUTANEOUS at 21:47

## 2024-10-17 RX ADMIN — HYDROMORPHONE HYDROCHLORIDE 0.5 MILLIGRAM(S): 1 INJECTION, SOLUTION INTRAMUSCULAR; INTRAVENOUS; SUBCUTANEOUS at 18:18

## 2024-10-17 RX ADMIN — Medication 100 MILLILITER(S): at 13:12

## 2024-10-17 RX ADMIN — HYDROMORPHONE HYDROCHLORIDE 0.5 MILLIGRAM(S): 1 INJECTION, SOLUTION INTRAMUSCULAR; INTRAVENOUS; SUBCUTANEOUS at 18:48

## 2024-10-17 NOTE — PATIENT PROFILE ADULT - FALL HARM RISK - CONCLUSION
Patient called back regarding COVID results.  Patient presented to [facility] on August 7 for COVID only testing.  The RN informed the patient of her positive results.  
Fall with Harm Risk

## 2024-10-17 NOTE — BRIEF OPERATIVE NOTE - OPERATION/FINDINGS
Robotic assisted paraesophageal hiatal hernia was done. Bilateral TAP block was given at the beginning of the case. The hiatal hernia was dissected, reduced and closed. A toupet fundoplication was performed and intraop EGD was done.

## 2024-10-17 NOTE — PATIENT PROFILE ADULT - FUNCTIONAL ASSESSMENT - BASIC MOBILITY SCORE.
Nutrition Services Progress Note    Calorie Count  (9/28)  806 kcals and 57 g protein, 2 meals and 1 Magic Cup.  PO 25% of breakfast per nursing flow sheets, not included in total since exact items consumed not known.     Four day average (9/25-9/28):  1154 kcals and 47 g protein, to meet 57% minimum energy needs and 58% minimum protein needs.    Discussed TF recommendation with MD and Endocrine.  Per agreement adjusted cycle time:  Isosource 1.5@60 mL/hr x 12 hours (6 pm to 6 am) to provide:  720 mLs, 1080 kcals, 49 g protein, 547 mLs water, 127 g CHO.  Continue Water Flush:  100 mLs every 4 hours.  Total Water from TF plus flushes:  1147 mLs.    Reordered Calorie Count x 3 days to further aid in TF adjustment.      Charleen Recinos RD, LD           24

## 2024-10-17 NOTE — BRIEF OPERATIVE NOTE - NSICDXBRIEFPROCEDURE_GEN_ALL_CORE_FT
PROCEDURES:  Repair, hernia, hiatal, robot-assisted 17-Oct-2024 11:18:12  Chika Hines  Robot-assisted Toupet fundoplication 17-Oct-2024 11:18:22  Chika Hines

## 2024-10-17 NOTE — PATIENT PROFILE ADULT - FALL HARM RISK - HARM RISK INTERVENTIONS

## 2024-10-17 NOTE — CHART NOTE - NSCHARTNOTEFT_GEN_A_CORE
General Surgery Post op Check    Pt seen and examined at bedside. Pt complains of mild gassy pain. Patient tried clear liquid diet, but felt abdominal pain. Patient has not ambulated. Patient voided 400cc. Incisions are clear, dry, and intact.  Denies SOB/CP/N/V.     Vital Signs Last 24 Hrs  T(C): 36.5 (17 Oct 2024 13:00), Max: 36.6 (17 Oct 2024 06:49)  T(F): 97.7 (17 Oct 2024 13:00), Max: 97.8 (17 Oct 2024 06:49)  HR: 95 (17 Oct 2024 14:00) (76 - 95)  BP: 142/82 (17 Oct 2024 14:00) (130/72 - 151/69)  BP(mean): --  RR: 18 (17 Oct 2024 14:00) (17 - 25)  SpO2: 98% (17 Oct 2024 14:00) (96% - 100%)    Parameters below as of 17 Oct 2024 13:00  Patient On (Oxygen Delivery Method): room air    I&O's Summary    17 Oct 2024 07:01  -  17 Oct 2024 15:43  --------------------------------------------------------  IN: 325 mL / OUT: 400 mL / NET: -75 mL    Physical Exam  Gen: NAD, A&Ox3  Pulm: No respiratory distress, no subcostal retractions  CV: RRR, no JVD  Abd: Soft, NT, ND, Incisions c/d/i with Dermabond  Extremities:  FROM, warm and well perfused, equal bilateral muscle strength      A/P: 68y Female with a pmh of HTN, Sjogren's disease, hypothyroid, IBS, PTX repaired with pleurodesis, Carrington's esophagus and hiatal hernia, who is now S/P robotic paraesophageal hernia repair with Toupet fundoplication with TAP block.     - Clear liquid diet  - LR @100  - Lovenox   - Resume home medications   - Pain: Dilaudid PRN, Gabapentin TID (Patient allergic to Tylenol & Ibuprofen)   - Zofran PRN  - Follow up PM Labs   - Strict I&Os   - Hemodynamic monitoring   - Encourage OOB & Incentive spirometer
PACU ANESTHESIA ADMISSION NOTE      Procedure:  robotic Hiatal hernia repair   Post op diagnosis:  hiatal hernia     ____  Intubated  TV:______       Rate: ______      FiO2: ______    __x__  Patent Airway    __x__  Full return of protective reflexes    __x__  Full recovery from anesthesia / back to baseline status      Mental Status:  __x__ Awake   ___x__ Alert   _____ Drowsy   _____ Sedated    Nausea/Vomiting:  __x__ NO  ______Yes,   See Post - Op Orders          Pain Scale (0-10):  __0___    Treatment: ____ None    __x__ See Post - Op/PCA Orders    Post - Operative Fluids:   ____ Oral   __x__ See Post - Op Orders    Plan: Discharge:   ____Home       ___x__Floor     _____Critical Care    _____  Other:_________________    Comments: Patient had smooth intraoperative event, no anesthesia complication.      PACU Vital signs: HR:   92         BP:    137    / 74         RR:    16         O2 Sat:   99    %     Temp

## 2024-10-18 ENCOUNTER — TRANSCRIPTION ENCOUNTER (OUTPATIENT)
Age: 69
End: 2024-10-18

## 2024-10-18 VITALS
SYSTOLIC BLOOD PRESSURE: 131 MMHG | DIASTOLIC BLOOD PRESSURE: 82 MMHG | HEART RATE: 75 BPM | RESPIRATION RATE: 18 BRPM | TEMPERATURE: 98 F | OXYGEN SATURATION: 99 %

## 2024-10-18 RX ORDER — MAGNESIUM SULFATE 500 MG/ML
2 VIAL (ML) INJECTION ONCE
Refills: 0 | Status: COMPLETED | OUTPATIENT
Start: 2024-10-18 | End: 2024-10-18

## 2024-10-18 RX ORDER — OXYCODONE HYDROCHLORIDE 30 MG/1
1 TABLET, FILM COATED, EXTENDED RELEASE ORAL
Qty: 5 | Refills: 0
Start: 2024-10-18

## 2024-10-18 RX ADMIN — HYDROMORPHONE HYDROCHLORIDE 0.5 MILLIGRAM(S): 1 INJECTION, SOLUTION INTRAMUSCULAR; INTRAVENOUS; SUBCUTANEOUS at 02:00

## 2024-10-18 RX ADMIN — Medication 25 GRAM(S): at 00:40

## 2024-10-18 RX ADMIN — Medication 100 MILLILITER(S): at 00:40

## 2024-10-18 RX ADMIN — LOSARTAN POTASSIUM 100 MILLIGRAM(S): 100 TABLET, FILM COATED ORAL at 05:45

## 2024-10-18 RX ADMIN — Medication 75 MICROGRAM(S): at 05:44

## 2024-10-18 RX ADMIN — GABAPENTIN 100 MILLIGRAM(S): 800 TABLET, FILM COATED ORAL at 05:45

## 2024-10-18 RX ADMIN — Medication 10 MILLIGRAM(S): at 05:45

## 2024-10-18 RX ADMIN — HYDROMORPHONE HYDROCHLORIDE 0.5 MILLIGRAM(S): 1 INJECTION, SOLUTION INTRAMUSCULAR; INTRAVENOUS; SUBCUTANEOUS at 01:30

## 2024-10-18 NOTE — PROGRESS NOTE ADULT - ATTENDING COMMENTS
POD1 robo hiatal hernia repair with toupet  required overnight admission due to nausea and pain- she has an allergy to tylenol and ibuprofen and required several doses of IV dilaudid. She also was not able to tolerate enough oral intake due to nausea and needed to stay for IVF.   She is doing better this AM and is drinking more and her pain is better controlled.   Her vitals and labs were reviewed  She is able to be discharged home today and her post-op instructions were reviewed

## 2024-10-18 NOTE — DISCHARGE NOTE PROVIDER - NSDCMRMEDTOKEN_GEN_ALL_CORE_FT
amLODIPine 10 mg oral tablet: 1 tab(s) orally once a day  atorvastatin 10 mg oral tablet: 1 tab(s) orally once a day (at bedtime)  calcium (as carbonate) 500 mg oral tablet: orally once a day  Claritin 10 mg oral tablet: 1 tab(s) orally once a day  famotidine 20 mg oral tablet: 2 tab(s) orally 2 times a day  levothyroxine 75 mcg (0.075 mg) oral capsule: 1 cap(s) orally once a day  losartan 100 mg oral tablet: 1 tab(s) orally once a day  omeprazole 40 mg oral delayed release capsule: 1 cap(s) orally once a day (at bedtime)  Prolia 60 mg/mL subcutaneous solution: 60 milligram(s) subcutaneously every 6 months   amLODIPine 10 mg oral tablet: 1 tab(s) orally once a day  atorvastatin 10 mg oral tablet: 1 tab(s) orally once a day (at bedtime)  calcium (as carbonate) 500 mg oral tablet: orally once a day  Claritin 10 mg oral tablet: 1 tab(s) orally once a day  famotidine 20 mg oral tablet: 2 tab(s) orally 2 times a day  levothyroxine 75 mcg (0.075 mg) oral capsule: 1 cap(s) orally once a day  losartan 100 mg oral tablet: 1 tab(s) orally once a day  omeprazole 40 mg oral delayed release capsule: 1 cap(s) orally once a day (at bedtime)  oxyCODONE 5 mg oral tablet: 1 tab(s) orally every 8 hours as needed for  severe pain MDD: 3  Prolia 60 mg/mL subcutaneous solution: 60 milligram(s) subcutaneously every 6 months

## 2024-10-18 NOTE — PROGRESS NOTE ADULT - ASSESSMENT
Patient is a 68 year old  female presenting for  robotic periesophageal hernia repair with toupet fundoplication on 10/17  under general anesthesia. Today on POD 1. Over night with minimal PO intake.    PLAN:   - Clear liquid diet  - LR @100  - Lovenox   - Pain: Dilaudid PRN, Gabapentin TID (Patient allergic to Tylenol & Ibuprofen)   - Zofran PRN  - Follow up PM Labs   - Strict I&Os   - Hemodynamic monitoring   - Encourage OOB & Incentive spirometer.      to be discussed with attending

## 2024-10-18 NOTE — DISCHARGE NOTE NURSING/CASE MANAGEMENT/SOCIAL WORK - NSDCPEFALRISK_GEN_ALL_CORE
For information on Fall & Injury Prevention, visit: [email or web address] OR  [email or web address] OR  https://www.cdc.gov/steadi/patient.html

## 2024-10-18 NOTE — DISCHARGE NOTE NURSING/CASE MANAGEMENT/SOCIAL WORK - NSDCVIVACCINE_GEN_ALL_CORE_FT
influenza, injectable, quadrivalent, preservative free; 18-Jun-2022 09:33; Hammad Walsh (RN); [employer]; [id number] (Exp. Date: 30-Jun-2022); IntraMuscular; Deltoid Right.; 0.5 milliLiter(s); VIS (VIS Published: 06-Aug-2021, VIS Presented: 18-Jun-2022);   Tdap; 10-Dec-2021 08:20; Nataliia Burrell (RN); [employer]; [id number] (Exp. Date: 09-Sep-2023); IntraMuscular; Deltoid Left.; 0.5 milliLiter(s); VIS (VIS Published: 09-May-2013, VIS Presented: 10-Dec-2021);

## 2024-10-18 NOTE — DISCHARGE NOTE PROVIDER - NSDCCPCAREPLAN_GEN_ALL_CORE_FT
PRINCIPAL DISCHARGE DIAGNOSIS  Diagnosis: Hiatal hernia  Assessment and Plan of Treatment: Activity: No heavy lifting > 10 lbs for 2 weeks. Avoid straining or excessive activity x 6 weeks.   Dressings: . Do not scrub wounds. You may shower but do not bathe. May use ice packs for pain and swelling.   Pain control: You may take over-the-counter tylenol and motrin three times per day with food for up to 3 days. Oxycodone was sent to your pharmacy. Please do not drive, operate machinery, or make important decisions while taking this medication. Please take only for severe pain.   Diet: Please continue liquid diet. You make take ensures.   Follow up: Please call the number provided to make an appointment with Dr. Jean in 1-2 weeks. Please call with any questions or concerns including fevers, worsening pain, pus from the wounds, or redness of the skin.

## 2024-10-18 NOTE — DISCHARGE NOTE PROVIDER - HOSPITAL COURSE
68 year old female presents for scheduled robot assisted paraesophageal hiatal hernia repair. Intraoperatively, robotic assisted paraesophageal hiatal hernia was done. Bilateral TAP block was given at the beginning of the case. The hiatal hernia was dissected, reduced and closed. A toupet fundoplication was performed and intraop EGD was done. Post operatively, patient tolerating CLD, voiding, ambulating, pain is controlled. Patient is medically appropriate for discharge.

## 2024-10-18 NOTE — DISCHARGE NOTE PROVIDER - NSDCCPTREATMENT_GEN_ALL_CORE_FT
PRINCIPAL PROCEDURE  Procedure: Repair, hernia, hiatal, robot-assisted  Findings and Treatment:       SECONDARY PROCEDURE  Procedure: Robot-assisted Toupet fundoplication  Findings and Treatment:

## 2024-10-18 NOTE — PROGRESS NOTE ADULT - SUBJECTIVE AND OBJECTIVE BOX
SURGERY PROGRESS NOTE    Patient: ELENA BAZAN , 68y (59)Female   MRN: [id number]  Location: [address]  Visit: 10-17-24 Inpatient  Date: 10-18-24 @ 01:12    Hospital Day #:  3  Post-Op Day #: 1    Procedure/Dx/Injuries: S/P robotic paraesophageal hernia repair with Toupet fundoplication     Events of past 24 hours: Over night patient with moderate pain controlled by medication. Not passing gas or having bowel movements. Post op labs with stable Hb, no increased WBC, Magnesium repleted.    PAST MEDICAL & SURGICAL HISTORY:  HTN (hypertension)      Hypothyroid      H/O left bundle branch block      Sjogren's disease      H/O pneumothorax  repaired with pleurodesis      History of IBS      H/O: osteoarthritis      GERD (gastroesophageal reflux disease)      Hiatal hernia      History of osteoporosis      History of right hip replacement      H/O  section      H/O pneumothorax          Vitals:   T(F): 97.9 (10-18-24 @ 00:00), Max: 98.7 (10-17-24 @ 16:06)  HR: 71 (10-18-24 @ 00:00)  BP: 136/75 (10-18-24 @ 00:00)  RR: 18 (10-18-24 @ 00:00)  SpO2: 97% (10-18-24 @ 00:00)      Diet, Clear Liquid:   No Carbonated Beverages      Fluids: lactated ringers.: Solution, 1000 milliLiter(s) infuse at 100 mL/Hr      I & O's:    Physical Exam  Gen: NAD, A&Ox3  Pulm: No respiratory distress, no subcostal retractions  CV: RRR, no JVD  Abd: Soft, NT, ND, Incisions c/d/i with Dermabond  Extremities:  FROM, warm and well perfused, equal bilateral muscle strength    MEDICATIONS  (STANDING):  amLODIPine   Tablet 10 milliGRAM(s) Oral daily  atorvastatin 10 milliGRAM(s) Oral at bedtime  enoxaparin Injectable 40 milliGRAM(s) SubCutaneous every 24 hours  gabapentin 100 milliGRAM(s) Oral three times a day  influenza  Vaccine (HIGH DOSE) 0.5 milliLiter(s) IntraMuscular once  lactated ringers. 1000 milliLiter(s) (100 mL/Hr) IV Continuous <Continuous>  levothyroxine 75 MICROGram(s) Oral daily  losartan 100 milliGRAM(s) Oral daily    MEDICATIONS  (PRN):  HYDROmorphone  Injectable 0.5 milliGRAM(s) IV Push every 6 hours PRN Severe Pain (7 - 10)  ondansetron    Tablet 4 milliGRAM(s) Oral every 6 hours PRN Nausea and/or Vomiting      DVT PROPHYLAXIS: enoxaparin Injectable 40 milliGRAM(s) SubCutaneous every 24 hours    GI PROPHYLAXIS:   ANTICOAGULATION:   ANTIBIOTICS:            LAB/STUDIES:  Labs:  CAPILLARY BLOOD GLUCOSE                              11.7   4.76  )-----------( 235      ( 17 Oct 2024 21:33 )             35.1       Auto Neutrophil %: 79.5 % (10-17-24 @ 21:33)  Auto Immature Granulocyte %: 0.4 % (10-17-24 @ 21:33)    10-17    136  |  100  |  9[L]  ----------------------------<  111[H]  4.1   |  23  |  0.6[L]      Calcium: 9.4 mg/dL (10-17-24 @ 21:33)      LFTs:         Coags:            Urinalysis Basic - ( 17 Oct 2024 21:33 )    Color: x / Appearance: x / SG: x / pH: x  Gluc: 111 mg/dL / Ketone: x  / Bili: x / Urobili: x   Blood: x / Protein: x / Nitrite: x   Leuk Esterase: x / RBC: x / WBC x   Sq Epi: x / Non Sq Epi: x / Bacteria: x

## 2024-10-18 NOTE — DISCHARGE NOTE NURSING/CASE MANAGEMENT/SOCIAL WORK - FINANCIAL ASSISTANCE
[facility] provides services at a reduced cost to those who are determined to be eligible through [facility]’s financial assistance program. Information regarding [facility]’s financial assistance program can be found by going to [email or web address] or by calling [phone].

## 2024-10-18 NOTE — DISCHARGE NOTE NURSING/CASE MANAGEMENT/SOCIAL WORK - PATIENT PORTAL LINK FT
You can access the FollowMyHealth Patient Portal offered by [facility] by registering at the following website: [email or web address]. By joining [facility]’s FollowMyHealth portal, you will also be able to view your health information using other applications (apps) compatible with our system.

## 2024-10-18 NOTE — DISCHARGE NOTE PROVIDER - CARE PROVIDER_API CALL
Nola Jean  Surgery  [address] [place] [address], [address] [place] [address]  Phone: [phone]  Fax: [phone]  Follow Up Time: 2 weeks

## 2024-10-18 NOTE — DISCHARGE NOTE PROVIDER - NSDCFUSCHEDAPPT_GEN_ALL_CORE_FT
Trent Barboza Physician [facility]  CARDIOLOGY [address] [place] [address]  Scheduled Appointment: 12/05/2024

## 2024-10-25 DIAGNOSIS — E03.9 HYPOTHYROIDISM, UNSPECIFIED: ICD-10-CM

## 2024-10-25 DIAGNOSIS — M35.00 SJOGREN SYNDROME, UNSPECIFIED: ICD-10-CM

## 2024-10-25 DIAGNOSIS — Z79.890 HORMONE REPLACEMENT THERAPY: ICD-10-CM

## 2024-10-25 DIAGNOSIS — Z96.641 PRESENCE OF RIGHT ARTIFICIAL HIP JOINT: ICD-10-CM

## 2024-10-25 DIAGNOSIS — K22.70 BARRETT'S ESOPHAGUS WITHOUT DYSPLASIA: ICD-10-CM

## 2024-10-25 DIAGNOSIS — I10 ESSENTIAL (PRIMARY) HYPERTENSION: ICD-10-CM

## 2024-10-25 DIAGNOSIS — K44.9 DIAPHRAGMATIC HERNIA WITHOUT OBSTRUCTION OR GANGRENE: ICD-10-CM

## 2024-10-25 DIAGNOSIS — E83.42 HYPOMAGNESEMIA: ICD-10-CM

## 2024-10-25 DIAGNOSIS — Z88.6 ALLERGY STATUS TO ANALGESIC AGENT: ICD-10-CM

## 2024-10-25 DIAGNOSIS — Z88.0 ALLERGY STATUS TO PENICILLIN: ICD-10-CM

## 2024-11-01 ENCOUNTER — APPOINTMENT (OUTPATIENT)
Dept: SURGERY | Facility: CLINIC | Age: 69
End: 2024-11-01
Payer: COMMERCIAL

## 2024-11-01 VITALS
HEART RATE: 77 BPM | HEIGHT: 62 IN | BODY MASS INDEX: 21.35 KG/M2 | SYSTOLIC BLOOD PRESSURE: 126 MMHG | DIASTOLIC BLOOD PRESSURE: 70 MMHG | OXYGEN SATURATION: 98 % | WEIGHT: 116 LBS

## 2024-11-01 DIAGNOSIS — K44.9 DIAPHRAGMATIC HERNIA W/OUT OBSTRUCTION OR GANGRENE: ICD-10-CM

## 2024-11-01 DIAGNOSIS — K21.9 DIAPHRAGMATIC HERNIA W/OUT OBSTRUCTION OR GANGRENE: ICD-10-CM

## 2024-11-01 PROCEDURE — 99024 POSTOP FOLLOW-UP VISIT: CPT

## 2024-11-08 ENCOUNTER — APPOINTMENT (OUTPATIENT)
Dept: CARDIOLOGY | Facility: CLINIC | Age: 69
End: 2024-11-08

## 2024-11-13 ENCOUNTER — APPOINTMENT (OUTPATIENT)
Dept: SURGERY | Facility: CLINIC | Age: 69
End: 2024-11-13
Payer: COMMERCIAL

## 2024-11-13 VITALS
OXYGEN SATURATION: 99 % | WEIGHT: 116 LBS | HEIGHT: 62 IN | BODY MASS INDEX: 21.35 KG/M2 | DIASTOLIC BLOOD PRESSURE: 70 MMHG | HEART RATE: 92 BPM | SYSTOLIC BLOOD PRESSURE: 130 MMHG

## 2024-11-13 DIAGNOSIS — K21.9 DIAPHRAGMATIC HERNIA W/OUT OBSTRUCTION OR GANGRENE: ICD-10-CM

## 2024-11-13 DIAGNOSIS — K44.9 DIAPHRAGMATIC HERNIA W/OUT OBSTRUCTION OR GANGRENE: ICD-10-CM

## 2024-11-13 PROCEDURE — 99024 POSTOP FOLLOW-UP VISIT: CPT

## 2024-12-13 ENCOUNTER — APPOINTMENT (OUTPATIENT)
Dept: SURGERY | Facility: CLINIC | Age: 69
End: 2024-12-13

## 2024-12-13 VITALS
HEART RATE: 77 BPM | HEIGHT: 62 IN | OXYGEN SATURATION: 99 % | SYSTOLIC BLOOD PRESSURE: 132 MMHG | BODY MASS INDEX: 21.53 KG/M2 | DIASTOLIC BLOOD PRESSURE: 86 MMHG | TEMPERATURE: 97 F | WEIGHT: 117 LBS

## 2024-12-13 DIAGNOSIS — K44.9 DIAPHRAGMATIC HERNIA W/OUT OBSTRUCTION OR GANGRENE: ICD-10-CM

## 2024-12-13 DIAGNOSIS — K21.9 DIAPHRAGMATIC HERNIA W/OUT OBSTRUCTION OR GANGRENE: ICD-10-CM

## 2024-12-13 DIAGNOSIS — K22.70 BARRETT'S ESOPHAGUS W/OUT DYSPLASIA: ICD-10-CM

## 2024-12-13 PROCEDURE — 99214 OFFICE O/P EST MOD 30 MIN: CPT | Mod: 24

## 2024-12-13 RX ORDER — OMEPRAZOLE 40 MG/1
40 CAPSULE, DELAYED RELEASE ORAL
Qty: 30 | Refills: 4 | Status: ACTIVE | COMMUNITY
Start: 2024-12-13 | End: 1900-01-01

## 2025-01-23 ENCOUNTER — APPOINTMENT (OUTPATIENT)
Dept: CARDIOLOGY | Facility: CLINIC | Age: 70
End: 2025-01-23
Payer: COMMERCIAL

## 2025-01-23 VITALS
WEIGHT: 120 LBS | SYSTOLIC BLOOD PRESSURE: 140 MMHG | BODY MASS INDEX: 22.08 KG/M2 | DIASTOLIC BLOOD PRESSURE: 80 MMHG | HEART RATE: 78 BPM | HEIGHT: 62 IN

## 2025-01-23 DIAGNOSIS — E78.5 HYPERLIPIDEMIA, UNSPECIFIED: ICD-10-CM

## 2025-01-23 DIAGNOSIS — I10 ESSENTIAL (PRIMARY) HYPERTENSION: ICD-10-CM

## 2025-01-23 DIAGNOSIS — I44.7 LEFT BUNDLE-BRANCH BLOCK, UNSPECIFIED: ICD-10-CM

## 2025-01-23 PROCEDURE — 93000 ELECTROCARDIOGRAM COMPLETE: CPT

## 2025-01-23 PROCEDURE — 99213 OFFICE O/P EST LOW 20 MIN: CPT | Mod: 25

## 2025-02-07 ENCOUNTER — APPOINTMENT (OUTPATIENT)
Dept: SURGERY | Facility: CLINIC | Age: 70
End: 2025-02-07

## 2025-04-25 ENCOUNTER — OUTPATIENT (OUTPATIENT)
Dept: OUTPATIENT SERVICES | Facility: HOSPITAL | Age: 70
LOS: 1 days | End: 2025-04-25
Payer: COMMERCIAL

## 2025-04-25 ENCOUNTER — APPOINTMENT (OUTPATIENT)
Dept: SPEECH THERAPY | Facility: CLINIC | Age: 70
End: 2025-04-25

## 2025-04-25 DIAGNOSIS — R42 DIZZINESS AND GIDDINESS: ICD-10-CM

## 2025-04-25 DIAGNOSIS — Z98.891 HISTORY OF UTERINE SCAR FROM PREVIOUS SURGERY: Chronic | ICD-10-CM

## 2025-04-25 DIAGNOSIS — Z96.641 PRESENCE OF RIGHT ARTIFICIAL HIP JOINT: Chronic | ICD-10-CM

## 2025-04-25 DIAGNOSIS — Z87.09 PERSONAL HISTORY OF OTHER DISEASES OF THE RESPIRATORY SYSTEM: Chronic | ICD-10-CM

## 2025-04-25 PROCEDURE — 92537 CALORIC VSTBLR TEST W/REC: CPT

## 2025-04-25 PROCEDURE — 92540 BASIC VESTIBULAR EVALUATION: CPT

## 2025-05-29 ENCOUNTER — TRANSCRIPTION ENCOUNTER (OUTPATIENT)
Age: 70
End: 2025-05-29

## 2025-07-01 NOTE — END OF VISIT
Addended by: MAMIE DAVIS on: 7/1/2025 11:41 AM     Modules accepted: Orders    
[Time Spent: ___ minutes] : I have spent [unfilled] minutes of time on the encounter.

## 2025-07-03 ENCOUNTER — APPOINTMENT (OUTPATIENT)
Dept: CARDIOLOGY | Facility: CLINIC | Age: 70
End: 2025-07-03
Payer: COMMERCIAL

## 2025-07-03 VITALS
SYSTOLIC BLOOD PRESSURE: 120 MMHG | BODY MASS INDEX: 22.63 KG/M2 | HEIGHT: 62 IN | HEART RATE: 71 BPM | DIASTOLIC BLOOD PRESSURE: 80 MMHG | WEIGHT: 123 LBS

## 2025-07-03 PROCEDURE — 93000 ELECTROCARDIOGRAM COMPLETE: CPT

## 2025-07-03 PROCEDURE — 99214 OFFICE O/P EST MOD 30 MIN: CPT

## 2025-07-03 PROCEDURE — G2211 COMPLEX E/M VISIT ADD ON: CPT | Mod: NC

## 2025-08-27 ENCOUNTER — RX RENEWAL (OUTPATIENT)
Age: 70
End: 2025-08-27